# Patient Record
Sex: FEMALE | Race: WHITE
[De-identification: names, ages, dates, MRNs, and addresses within clinical notes are randomized per-mention and may not be internally consistent; named-entity substitution may affect disease eponyms.]

---

## 2018-12-25 ENCOUNTER — HOSPITAL ENCOUNTER (INPATIENT)
Dept: HOSPITAL 47 - EC | Age: 83
LOS: 9 days | Discharge: HOME HEALTH | DRG: 872 | End: 2019-01-03
Payer: MEDICARE

## 2018-12-25 VITALS — BODY MASS INDEX: 27.9 KG/M2

## 2018-12-25 DIAGNOSIS — H40.9: ICD-10-CM

## 2018-12-25 DIAGNOSIS — Z88.2: ICD-10-CM

## 2018-12-25 DIAGNOSIS — R53.81: ICD-10-CM

## 2018-12-25 DIAGNOSIS — R17: ICD-10-CM

## 2018-12-25 DIAGNOSIS — Z95.0: ICD-10-CM

## 2018-12-25 DIAGNOSIS — Z80.1: ICD-10-CM

## 2018-12-25 DIAGNOSIS — I10: ICD-10-CM

## 2018-12-25 DIAGNOSIS — R41.0: ICD-10-CM

## 2018-12-25 DIAGNOSIS — R62.7: ICD-10-CM

## 2018-12-25 DIAGNOSIS — H35.30: ICD-10-CM

## 2018-12-25 DIAGNOSIS — Z82.5: ICD-10-CM

## 2018-12-25 DIAGNOSIS — J98.01: ICD-10-CM

## 2018-12-25 DIAGNOSIS — A41.9: Primary | ICD-10-CM

## 2018-12-25 DIAGNOSIS — E86.0: ICD-10-CM

## 2018-12-25 DIAGNOSIS — N39.0: ICD-10-CM

## 2018-12-25 DIAGNOSIS — Z88.5: ICD-10-CM

## 2018-12-25 DIAGNOSIS — I44.0: ICD-10-CM

## 2018-12-25 DIAGNOSIS — D61.818: ICD-10-CM

## 2018-12-25 DIAGNOSIS — Z87.891: ICD-10-CM

## 2018-12-25 DIAGNOSIS — E78.5: ICD-10-CM

## 2018-12-25 DIAGNOSIS — I48.2: ICD-10-CM

## 2018-12-25 DIAGNOSIS — Z79.01: ICD-10-CM

## 2018-12-25 DIAGNOSIS — C94.6: ICD-10-CM

## 2018-12-25 DIAGNOSIS — Z90.49: ICD-10-CM

## 2018-12-25 DIAGNOSIS — Z88.8: ICD-10-CM

## 2018-12-25 DIAGNOSIS — E89.0: ICD-10-CM

## 2018-12-25 LAB
ALBUMIN SERPL-MCNC: 3.4 G/DL (ref 3.5–5)
ALP SERPL-CCNC: 135 U/L (ref 38–126)
ALT SERPL-CCNC: 36 U/L (ref 9–52)
AMYLASE SERPL-CCNC: 68 U/L (ref 30–110)
ANION GAP SERPL CALC-SCNC: 8 MMOL/L
AST SERPL-CCNC: 61 U/L (ref 14–36)
BASOPHILS # BLD AUTO: 0 K/UL (ref 0–0.2)
BASOPHILS NFR BLD AUTO: 0 %
BUN SERPL-SCNC: 19 MG/DL (ref 7–17)
CALCIUM SPEC-MCNC: 8.6 MG/DL (ref 8.4–10.2)
CHLORIDE SERPL-SCNC: 108 MMOL/L (ref 98–107)
CK SERPL-CCNC: 88 U/L (ref 30–135)
CO2 SERPL-SCNC: 24 MMOL/L (ref 22–30)
EOSINOPHIL # BLD AUTO: 0.1 K/UL (ref 0–0.7)
EOSINOPHIL NFR BLD AUTO: 3 %
ERYTHROCYTE [DISTWIDTH] IN BLOOD BY AUTOMATED COUNT: 3.6 M/UL (ref 3.8–5.4)
ERYTHROCYTE [DISTWIDTH] IN BLOOD: 15 % (ref 11.5–15.5)
GLUCOSE SERPL-MCNC: 126 MG/DL (ref 74–99)
HCT VFR BLD AUTO: 38.9 % (ref 34–46)
HGB BLD-MCNC: 12.4 GM/DL (ref 11.4–16)
LIPASE SERPL-CCNC: 307 U/L (ref 23–300)
LYMPHOCYTES # SPEC AUTO: 0.5 K/UL (ref 1–4.8)
LYMPHOCYTES NFR SPEC AUTO: 9 %
MCH RBC QN AUTO: 34.5 PG (ref 25–35)
MCHC RBC AUTO-ENTMCNC: 31.9 G/DL (ref 31–37)
MCV RBC AUTO: 108.2 FL (ref 80–100)
MONOCYTES # BLD AUTO: 0.3 K/UL (ref 0–1)
MONOCYTES NFR BLD AUTO: 5 %
NEUTROPHILS # BLD AUTO: 4.8 K/UL (ref 1.3–7.7)
NEUTROPHILS NFR BLD AUTO: 81 %
PLATELET # BLD AUTO: 56 K/UL (ref 150–450)
POTASSIUM SERPL-SCNC: 3.9 MMOL/L (ref 3.5–5.1)
PROT SERPL-MCNC: 7.9 G/DL (ref 6.3–8.2)
SODIUM SERPL-SCNC: 140 MMOL/L (ref 137–145)
TROPONIN I SERPL-MCNC: <0.012 NG/ML (ref 0–0.03)
WBC # BLD AUTO: 5.9 K/UL (ref 3.8–10.6)

## 2018-12-25 PROCEDURE — 82784 ASSAY IGA/IGD/IGG/IGM EACH: CPT

## 2018-12-25 PROCEDURE — 82553 CREATINE MB FRACTION: CPT

## 2018-12-25 PROCEDURE — 83921 ORGANIC ACID SINGLE QUANT: CPT

## 2018-12-25 PROCEDURE — 83036 HEMOGLOBIN GLYCOSYLATED A1C: CPT

## 2018-12-25 PROCEDURE — 83540 ASSAY OF IRON: CPT

## 2018-12-25 PROCEDURE — 85610 PROTHROMBIN TIME: CPT

## 2018-12-25 PROCEDURE — 86334 IMMUNOFIX E-PHORESIS SERUM: CPT

## 2018-12-25 PROCEDURE — 71046 X-RAY EXAM CHEST 2 VIEWS: CPT

## 2018-12-25 PROCEDURE — 74018 RADEX ABDOMEN 1 VIEW: CPT

## 2018-12-25 PROCEDURE — 36415 COLL VENOUS BLD VENIPUNCTURE: CPT

## 2018-12-25 PROCEDURE — 87077 CULTURE AEROBIC IDENTIFY: CPT

## 2018-12-25 PROCEDURE — 80074 ACUTE HEPATITIS PANEL: CPT

## 2018-12-25 PROCEDURE — 87086 URINE CULTURE/COLONY COUNT: CPT

## 2018-12-25 PROCEDURE — 84165 PROTEIN E-PHORESIS SERUM: CPT

## 2018-12-25 PROCEDURE — 85025 COMPLETE CBC W/AUTO DIFF WBC: CPT

## 2018-12-25 PROCEDURE — 83880 ASSAY OF NATRIURETIC PEPTIDE: CPT

## 2018-12-25 PROCEDURE — 99285 EMERGENCY DEPT VISIT HI MDM: CPT

## 2018-12-25 PROCEDURE — 83883 ASSAY NEPHELOMETRY NOT SPEC: CPT

## 2018-12-25 PROCEDURE — 83690 ASSAY OF LIPASE: CPT

## 2018-12-25 PROCEDURE — 94640 AIRWAY INHALATION TREATMENT: CPT

## 2018-12-25 PROCEDURE — 82607 VITAMIN B-12: CPT

## 2018-12-25 PROCEDURE — 87186 SC STD MICRODIL/AGAR DIL: CPT

## 2018-12-25 PROCEDURE — 84484 ASSAY OF TROPONIN QUANT: CPT

## 2018-12-25 PROCEDURE — 85730 THROMBOPLASTIN TIME PARTIAL: CPT

## 2018-12-25 PROCEDURE — 82746 ASSAY OF FOLIC ACID SERUM: CPT

## 2018-12-25 PROCEDURE — 81001 URINALYSIS AUTO W/SCOPE: CPT

## 2018-12-25 PROCEDURE — 83605 ASSAY OF LACTIC ACID: CPT

## 2018-12-25 PROCEDURE — 85027 COMPLETE CBC AUTOMATED: CPT

## 2018-12-25 PROCEDURE — 80053 COMPREHEN METABOLIC PANEL: CPT

## 2018-12-25 PROCEDURE — 83550 IRON BINDING TEST: CPT

## 2018-12-25 PROCEDURE — 74177 CT ABD & PELVIS W/CONTRAST: CPT

## 2018-12-25 PROCEDURE — 82550 ASSAY OF CK (CPK): CPT

## 2018-12-25 PROCEDURE — 96361 HYDRATE IV INFUSION ADD-ON: CPT

## 2018-12-25 PROCEDURE — 85045 AUTOMATED RETICULOCYTE COUNT: CPT

## 2018-12-25 PROCEDURE — 82728 ASSAY OF FERRITIN: CPT

## 2018-12-25 PROCEDURE — 83735 ASSAY OF MAGNESIUM: CPT

## 2018-12-25 PROCEDURE — 83615 LACTATE (LD) (LDH) ENZYME: CPT

## 2018-12-25 PROCEDURE — 96374 THER/PROPH/DIAG INJ IV PUSH: CPT

## 2018-12-25 PROCEDURE — 82150 ASSAY OF AMYLASE: CPT

## 2018-12-25 NOTE — ED
Weakness HPI





- General


Chief complaint: Weakness


Stated complaint: Weakness


Time Seen by Provider: 18 22:38


Source: patient, EMS





- History of Present Illness


Initial comments: 


Magdalena is a pleasant 88-year-old female who is brought to the emergency 

department today via EMS for evaluation of generalized malaise, nausea and 

vomiting.  Patient reports she hasn't been feeling well for couple of days 

however she didn't seek any care because she didn't want to rule in the 

holidays for her family.  Patient reports she's been fatigued, however loss of 

appetite and been much more tired than usual.  Patient states that today she 

had no appetite but she did have [place] dinner with her son.  Her son states 

that he only noticed that she took some few nibbles of her food.  A couple 

hours after eating the patient became nauseated and had multiple episodes of 

nonbloody nonbilious emesis.  At that point son decided to bring her to the 

emergency department however the patient was too weak to stand even with her 

assistance he could not get her to the car therefore decision was made to call 

EMS.  Patient denies any chest pain palpitations or shortness of breath.  She 

does report some recent loose stools.  She has been in contact with people who 

have had some similar symptoms including nausea, vomiting and diarrhea.








- Related Data


 Home Medications











 Medication  Instructions  Recorded  Confirmed


 


Rivaroxaban [Xarelto] 15 mg PO DAILY 18


 


Brimonidine Tartrate/Timolol 1 drop BOTH EYES BID 18





[Combigan 0.2%-0.5% Eye Drops]   











 Allergies











Allergy/AdvReac Type Severity Reaction Status Date / Time


 


morphine AdvReac Intermediate Nausea & Verified 18 23:14





   Vomiting  


 


Sulfa (Sulfonamide AdvReac Intermediate Nausea & Verified 18 23:14





Antibiotics)   Vomiting  


 


Statins-Hmg-Coa Reductase AdvReac  Nausea Verified 18 23:14





Inhibitor     














Review of Systems


ROS Statement: 


Those systems with pertinent positive or pertinent negative responses have been 

documented in the HPI.





ROS Other: All systems not noted in ROS Statement are negative.





Past Medical History


Past Medical History: Chest Pain / Angina, Hypertension


Additional Past Medical History / Comment(s): hypokalemia


History of Any Multi-Drug Resistant Organisms: None Reported


Past Surgical History: Cholecystectomy, Pacemaker


Additional Past Surgical History / Comment(s): thyroidectomy


Past Anesthesia/Blood Transfusion Reactions: No Reported Reaction


Additional Past Anesthesia/Blood Transfusion Reaction / Comment(s): NEVER 

RECIEVED BLOOD.


Type of Cardiac Device: Permanent Pacemaker


Device Placement Date:: 


Past Psychological History: No Psychological Hx Reported, Depression


Smoking Status: Former smoker


Past Alcohol Use History: Rare


Past Drug Use History: None Reported





- Past Family History


  ** Father


Family Medical History: Cancer, COPD


Additional Family Medical History / Comment(s): FATHER  OF LUNG [place]





  ** Mother


Family Medical History: Cancer


Additional Family Medical History / Comment(s): MOTHER  OF SOME FORM OF CA 

WHEN PT WAS 3 YRS OLD.





General Exam





- General Exam Comments


Initial Comments: 


Physical Exam


GENERAL:


Elderly female, appears uncomfortable, appears nauseated





HENT:


Normocephalic, Atraumatic. 


Dry oral mucosa





EYES:


PERRL, EOMI





PULMONARY:


Unlabored respirations.  No audible rales rhonchi or wheezing was noted.





CARDIOVASCULAR:


There is a regular rate and rhythm without any murmurs gallops or rubs.  





ABDOMEN:


Soft with normal bowel sounds. 


Tenderness to palpation in the epigastrium





SKIN:


Skin is clear with no lesions or rashes and otherwise unremarkable.





: 


Deferred





NEUROLOGIC:


Patient is alert and oriented x3.  Moving all extremities spontaneously





MUSCULOSKELETAL:


Normal extremities with adequate strength and full range of motion.  No lower 

extremity swelling or edema.  No calf tenderness.  





PSYCHIATRIC:


Normal psychiatric evaluation.  





Limitations: no limitations








Course


 Vital Signs











  18





  22:11


 


Pulse Rate 92


 


Respiratory 16





Rate 


 


Blood Pressure 198/73


 


O2 Sat by Pulse 97





Oximetry 














EKG Findings





- EKG Comments:


EKG Findings:: EKG obtained at 10:22 PM, rate is 89 rhythm is sinus, there is a 

first-degree AV block, , QRS 84, .  No acute ST elevations or 

depressions no evidence of acute ischemia or infarction.





Medical Decision Making





- Medical Decision Making


Patient was seen and evaluated, history was obtained from the patient and 

family at bedside


Labs and imaging were ordered





Labs with multiple mild abnormalities including mildly elevated transaminases, 

elevated bili, elevated lipase, leukocytosis


Computed tomography scan was ordered





Patient was reevaluated, some improvement in nausea after Zofran, IV fluids are 

infusing


Urinalysis suggestive of urinary tract infection, culture was ordered, Rocephin 

ordered





Given the patient's advanced age, persistent nausea, multiple lab abnormalities 

I do feel that she would benefit from being admitted to the hospital for IV 

fluid rehydration and reevaluation of her labs in the morning.  Patient is 

agreeable to this.  Admission orders were placed.








- Lab Data


Result diagrams: 


 18 22:15





 18 22:15


 Lab Results











  18 Range/Units





  22:15 22:15 22:15 


 


WBC    5.9  (3.8-10.6)  k/uL


 


RBC    3.60 L  (3.80-5.40)  m/uL


 


Hgb    12.4  (11.4-16.0)  gm/dL


 


Hct    38.9  (34.0-46.0)  %


 


MCV    108.2 H  (80.0-100.0)  fL


 


MCH    34.5  (25.0-35.0)  pg


 


MCHC    31.9  (31.0-37.0)  g/dL


 


RDW    15.0  (11.5-15.5)  %


 


Plt Count    56 L  (150-450)  k/uL


 


Neutrophils %    81  %


 


Lymphocytes %    9  %


 


Monocytes %    5  %


 


Eosinophils %    3  %


 


Basophils %    0  %


 


Neutrophils #    4.8  (1.3-7.7)  k/uL


 


Lymphocytes #    0.5 L  (1.0-4.8)  k/uL


 


Monocytes #    0.3  (0-1.0)  k/uL


 


Eosinophils #    0.1  (0-0.7)  k/uL


 


Basophils #    0.0  (0-0.2)  k/uL


 


Macrocytosis    Marked  


 


Sodium  140    (137-145)  mmol/L


 


Potassium  3.9    (3.5-5.1)  mmol/L


 


Chloride  108 H    ()  mmol/L


 


Carbon Dioxide  24    (22-30)  mmol/L


 


Anion Gap  8    mmol/L


 


BUN  19 H    (7-17)  mg/dL


 


Creatinine  0.67    (0.52-1.04)  mg/dL


 


Est GFR (CKD-EPI)AfAm  >90    (>60 ml/min/1.73 sqM)  


 


Est GFR (CKD-EPI)NonAf  79    (>60 ml/min/1.73 sqM)  


 


Glucose  126 H    (74-99)  mg/dL


 


Calcium  8.6    (8.4-10.2)  mg/dL


 


Total Bilirubin  2.2 H    (0.2-1.3)  mg/dL


 


AST  61 H    (14-36)  U/L


 


ALT  36    (9-52)  U/L


 


Alkaline Phosphatase  135 H    ()  U/L


 


Total Creatine Kinase   88   ()  U/L


 


CK-MB (CK-2)   1.1   (0.0-2.4)  ng/mL


 


CK-MB (CK-2) Rel Index   1.3   


 


Troponin I   <0.012   (0.000-0.034)  ng/mL


 


Total Protein  7.9    (6.3-8.2)  g/dL


 


Albumin  3.4 L    (3.5-5.0)  g/dL


 


Amylase  68    ()  U/L


 


Lipase  307 H    ()  U/L


 


Urine Color     


 


Urine Appearance     (Clear)  


 


Urine pH     (5.0-8.0)  


 


Ur Specific Gravity     (1.001-1.035)  


 


Urine Protein     (Negative)  


 


Urine Glucose (UA)     (Negative)  


 


Urine Ketones     (Negative)  


 


Urine Blood     (Negative)  


 


Urine Nitrite     (Negative)  


 


Urine Bilirubin     (Negative)  


 


Urine Urobilinogen     (<2.0)  mg/dL


 


Ur Leukocyte Esterase     (Negative)  


 


Urine RBC     (0-5)  /hpf


 


Urine WBC     (0-5)  /hpf


 


Ur Squamous Epith Cells     (0-4)  /hpf


 


Urine Bacteria     (None)  /hpf


 


Urine Mucus     (None)  /hpf














  18 Range/Units





  00:33 


 


WBC   (3.8-10.6)  k/uL


 


RBC   (3.80-5.40)  m/uL


 


Hgb   (11.4-16.0)  gm/dL


 


Hct   (34.0-46.0)  %


 


MCV   (80.0-100.0)  fL


 


MCH   (25.0-35.0)  pg


 


MCHC   (31.0-37.0)  g/dL


 


RDW   (11.5-15.5)  %


 


Plt Count   (150-450)  k/uL


 


Neutrophils %   %


 


Lymphocytes %   %


 


Monocytes %   %


 


Eosinophils %   %


 


Basophils %   %


 


Neutrophils #   (1.3-7.7)  k/uL


 


Lymphocytes #   (1.0-4.8)  k/uL


 


Monocytes #   (0-1.0)  k/uL


 


Eosinophils #   (0-0.7)  k/uL


 


Basophils #   (0-0.2)  k/uL


 


Macrocytosis   


 


Sodium   (137-145)  mmol/L


 


Potassium   (3.5-5.1)  mmol/L


 


Chloride   ()  mmol/L


 


Carbon Dioxide   (22-30)  mmol/L


 


Anion Gap   mmol/L


 


BUN   (7-17)  mg/dL


 


Creatinine   (0.52-1.04)  mg/dL


 


Est GFR (CKD-EPI)AfAm   (>60 ml/min/1.73 sqM)  


 


Est GFR (CKD-EPI)NonAf   (>60 ml/min/1.73 sqM)  


 


Glucose   (74-99)  mg/dL


 


Calcium   (8.4-10.2)  mg/dL


 


Total Bilirubin   (0.2-1.3)  mg/dL


 


AST   (14-36)  U/L


 


ALT   (9-52)  U/L


 


Alkaline Phosphatase   ()  U/L


 


Total Creatine Kinase   ()  U/L


 


CK-MB (CK-2)   (0.0-2.4)  ng/mL


 


CK-MB (CK-2) Rel Index   


 


Troponin I   (0.000-0.034)  ng/mL


 


Total Protein   (6.3-8.2)  g/dL


 


Albumin   (3.5-5.0)  g/dL


 


Amylase   ()  U/L


 


Lipase   ()  U/L


 


Urine Color  Yellow  


 


Urine Appearance  Cloudy H  (Clear)  


 


Urine pH  5.5  (5.0-8.0)  


 


Ur Specific Gravity  1.022  (1.001-1.035)  


 


Urine Protein  Trace H  (Negative)  


 


Urine Glucose (UA)  Negative  (Negative)  


 


Urine Ketones  Trace H  (Negative)  


 


Urine Blood  Small H  (Negative)  


 


Urine Nitrite  Negative  (Negative)  


 


Urine Bilirubin  Negative  (Negative)  


 


Urine Urobilinogen  2.0  (<2.0)  mg/dL


 


Ur Leukocyte Esterase  Large H  (Negative)  


 


Urine RBC  10 H  (0-5)  /hpf


 


Urine WBC  58 H  (0-5)  /hpf


 


Ur Squamous Epith Cells  24 H  (0-4)  /hpf


 


Urine Bacteria  Occasional H  (None)  /hpf


 


Urine Mucus  Few H  (None)  /hpf














Disposition


Clinical Impression: 


 Dehydration, Transaminitis, Elevated bilirubin, Nausea and vomiting, UTI (

urinary tract infection)





Disposition: ADMITTED AS IP TO THIS HOSP


Condition: Stable


Is patient prescribed a controlled substance at d/c from ED?: No


Referrals: 


Oscar Chavez MD [Primary Care Provider] - 1-2 days

## 2018-12-26 LAB
PH UR: 5.5 [PH] (ref 5–8)
RBC UR QL: 10 /HPF (ref 0–5)
SP GR UR: 1.02 (ref 1–1.03)
SQUAMOUS UR QL AUTO: 24 /HPF (ref 0–4)
UROBILINOGEN UR QL STRIP: 2 MG/DL (ref ?–2)
WBC #/AREA URNS HPF: 58 /HPF (ref 0–5)

## 2018-12-26 RX ADMIN — TIMOLOL MALEATE SCH DROPS: 5 SOLUTION OPHTHALMIC at 21:03

## 2018-12-26 RX ADMIN — FAMOTIDINE SCH MG: 20 TABLET, FILM COATED ORAL at 08:47

## 2018-12-26 RX ADMIN — FAMOTIDINE SCH MG: 20 TABLET, FILM COATED ORAL at 21:02

## 2018-12-26 RX ADMIN — ISODIUM CHLORIDE SCH: 0.03 SOLUTION RESPIRATORY (INHALATION) at 23:53

## 2018-12-26 RX ADMIN — ONDANSETRON PRN MG: 2 INJECTION INTRAMUSCULAR; INTRAVENOUS at 15:48

## 2018-12-26 RX ADMIN — BRIMONIDINE TARTRATE SCH DROPS: 2 SOLUTION/ DROPS OPHTHALMIC at 21:02

## 2018-12-26 RX ADMIN — CEFAZOLIN SCH MLS/HR: 330 INJECTION, POWDER, FOR SOLUTION INTRAMUSCULAR; INTRAVENOUS at 03:06

## 2018-12-26 RX ADMIN — CEFAZOLIN SCH: 330 INJECTION, POWDER, FOR SOLUTION INTRAMUSCULAR; INTRAVENOUS at 17:02

## 2018-12-26 RX ADMIN — POTASSIUM CHLORIDE SCH MLS/HR: 14.9 INJECTION, SOLUTION INTRAVENOUS at 18:29

## 2018-12-26 NOTE — CT
EXAMINATION TYPE: CT abdomen pelvis w con

 

DATE OF EXAM: 12/26/2018

 

COMPARISON: 1/30/2015

 

HISTORY: vomiting

 

CT DLP: 792.5 mGycm

Automated exposure control for dose reduction was used.

 

TECHNIQUE:  Helical acquisition of images was performed from the lung bases through the pelvis.

 

CONTRAST: 

Performed without Oral Contrast and with IV Contrast, patient injected with 100 mL of Isovue 300.

 

FINDINGS: 

 

There is left pleural effusion. Heart is enlarged. There is no pericardial effusion. There is small h
iatal hernia. Lung bases are clear of consolidation.

 

Liver shows no focal defect. Gallbladder appears absent. There are multiple tiny calcified splenic gr
anulomata. There is no evidence of pancreatic mass.

 

There is no adrenal mass. Kidneys show satisfactory contrast opacification. There is no hydronephrosi
s. There is no retroperitoneal adenopathy. Abdominal aorta is atheromatous. Appendix is not seen. The
re is no evidence of a bowel obstruction. Bladder distends smoothly. There is pessary in the vagina. 
There is no inguinal hernia. There is no free fluid in the pelvis. I see no mesenteric edema. There a
re multiple small mesenteric lymph nodes. There is degenerative disc space narrowing the lumbar spine
. I see no compression fracture. Bony pelvis is intact. There is mild anterior wedging of T10 vertebr
a with 20% loss of height. This is probably old. Fracture is new compared to old CT scan.

IMPRESSION: 

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 SMALL LEFT PLEURAL EFFUSION UNCHANGED. SMALL HIATAL HERNIA. CARDIOMEGALY. 
NO EVIDENCE OF A BOWEL OBSTRUCTION.

## 2018-12-26 NOTE — HP
HISTORY AND PHYSICAL



CHIEF COMPLAINT:

Generalized weakness, dehydration, nausea, vomiting, and probable UTI.



HISTORY OF PRESENT ILLNESS:

This is another admission for this 88-year-old white female.  She is ordinarily in

fairly good health.  She has had some problems with mild hypertension and atrial

fibrillation.  For about 2 days she has not felt well, has not been eating well.  On

the night of admission she suddenly developed chills, weakness, nausea and vomiting and

no diarrhea.  She came to the emergency room, where she was dehydrated, and urine

suggested that she had UTI.  She was admitted.



REVIEW OF SYSTEMS:

She denies any headaches, confusion, cough, hemoptysis, shortness of breath, chest

pain, abdominal pain, etc.



Past medical history, family history, and personal and social histories are all

otherwise unremarkable and noncontributory.  She does not smoke.



PHYSICAL EXAMINATION:

Blood pressure is 136/83 with a pulse of 92 and regular. Respirations were 36 and she

was afebrile.  In general, she appeared to be pale and dehydrated.  Skin was dry.

Mucous membranes were dry.  Head, ears, eyes, nose, mouth and throat were otherwise

normal.  There was no neck vein distention.  Chest was clear.  There were no rales or

rhonchi.  Cardiac exam demonstrated regular rate and rhythm.  The abdomen was soft.

Bowel sounds were heard.  There were no masses or visceromegaly and she was not tender.

Extremities were normal. Neurologically she was intact.



She is admitted to the hospital with the diagnoses:

1. Probable urinary tract infection.

2. Dehydration.

3. History of atrial fibrillation.

4. History of hypertension.



PLAN:

1. Bed rest.

2. IV fluids.

3. IV antibiotics.

4. Rehydrate.





MMODL / IJN: [id number] / Job#: [id number]

## 2018-12-26 NOTE — XR
EXAMINATION TYPE: XR KUB

 

DATE OF EXAM: 12/25/2018

 

COMPARISON: NONE

 

HISTORY: Vomiting

 

TECHNIQUE: 2 views upright

 

FINDINGS: There is no sign of intestinal obstruction or pneumoperitoneum. Fecal pattern is normal. Elke
ng bases appear clear. There are no pathologic calcifications over the kidneys.

 

IMPRESSION: Nonacute abdomen.

## 2018-12-26 NOTE — XR
EXAMINATION TYPE: XR chest 2V

 

DATE OF EXAM: 12/26/2018

 

COMPARISON: 1/27/2018

 

HISTORY: Wheezing

 

TECHNIQUE:  Frontal and lateral views of the chest are obtained.

 

FINDINGS:  Heart is enlarged. There is pulmonary interstitial edema. There is slight blunting of cost
ophrenic angles. There is left axillary pacemaker with the lead tips in the right ventricle.

 

IMPRESSION:  Mild heart failure. Small pleural effusions. Heart failure increased compared to last ex
am.

## 2018-12-27 LAB
ALBUMIN SERPL-MCNC: 2.7 G/DL (ref 3.5–5)
ALP SERPL-CCNC: 87 U/L (ref 38–126)
ALT SERPL-CCNC: 38 U/L (ref 9–52)
AMYLASE SERPL-CCNC: 54 U/L (ref 30–110)
AMYLASE SERPL-CCNC: 57 U/L (ref 30–110)
ANION GAP SERPL CALC-SCNC: 6 MMOL/L
APTT BLD: 39.3 SEC (ref 22–30)
AST SERPL-CCNC: 89 U/L (ref 14–36)
BASOPHILS # BLD MANUAL: 0.05 K/UL (ref 0–0.2)
BUN SERPL-SCNC: 18 MG/DL (ref 7–17)
CALCIUM SPEC-MCNC: 7.3 MG/DL (ref 8.4–10.2)
CELLS COUNTED: 100
CELLS COUNTED: 100
CHLORIDE SERPL-SCNC: 108 MMOL/L (ref 98–107)
CO2 SERPL-SCNC: 23 MMOL/L (ref 22–30)
EOSINOPHIL # BLD MANUAL: 0.12 K/UL (ref 0–0.7)
EOSINOPHIL # BLD MANUAL: 0.16 K/UL (ref 0–0.7)
ERYTHROCYTE [DISTWIDTH] IN BLOOD BY AUTOMATED COUNT: 3.02 M/UL (ref 3.8–5.4)
ERYTHROCYTE [DISTWIDTH] IN BLOOD BY AUTOMATED COUNT: 3.06 M/UL (ref 3.8–5.4)
ERYTHROCYTE [DISTWIDTH] IN BLOOD: 14.9 % (ref 11.5–15.5)
ERYTHROCYTE [DISTWIDTH] IN BLOOD: 15 % (ref 11.5–15.5)
GLUCOSE BLD-MCNC: 139 MG/DL (ref 75–99)
GLUCOSE SERPL-MCNC: 169 MG/DL (ref 74–99)
HCT VFR BLD AUTO: 33.4 % (ref 34–46)
HCT VFR BLD AUTO: 33.5 % (ref 34–46)
HGB BLD-MCNC: 10.7 GM/DL (ref 11.4–16)
HGB BLD-MCNC: 10.7 GM/DL (ref 11.4–16)
INR PPP: 2.2 (ref ?–1.2)
LDH SPEC-CCNC: 826 U/L (ref 313–618)
LIPASE SERPL-CCNC: 282 U/L (ref 23–300)
LIPASE SERPL-CCNC: 299 U/L (ref 23–300)
LYMPHOCYTES # BLD MANUAL: 0.32 K/UL (ref 1–4.8)
LYMPHOCYTES # BLD MANUAL: 0.64 K/UL (ref 1–4.8)
MAGNESIUM SPEC-SCNC: 1.5 MG/DL (ref 1.6–2.3)
MCH RBC QN AUTO: 34.8 PG (ref 25–35)
MCH RBC QN AUTO: 35.5 PG (ref 25–35)
MCHC RBC AUTO-ENTMCNC: 31.8 G/DL (ref 31–37)
MCHC RBC AUTO-ENTMCNC: 32.2 G/DL (ref 31–37)
MCV RBC AUTO: 109.3 FL (ref 80–100)
MCV RBC AUTO: 110.3 FL (ref 80–100)
MONOCYTES # BLD MANUAL: 0.28 K/UL (ref 0–1)
MONOCYTES # BLD MANUAL: 0.46 K/UL (ref 0–1)
NEUTROPHILS NFR BLD MANUAL: 47 %
NEUTROPHILS NFR BLD MANUAL: 65 %
NEUTS SEG # BLD MANUAL: 1.08 K/UL (ref 1.3–7.7)
NEUTS SEG # BLD MANUAL: 1.5 K/UL (ref 1.3–7.7)
PLATELET # BLD AUTO: 35 K/UL (ref 150–450)
PLATELET # BLD AUTO: 41 K/UL (ref 150–450)
POTASSIUM SERPL-SCNC: 3.9 MMOL/L (ref 3.5–5.1)
PROT SERPL-MCNC: 6.8 G/DL (ref 6.3–8.2)
PT BLD: 21.6 SEC (ref 9–12)
SODIUM SERPL-SCNC: 137 MMOL/L (ref 137–145)
WBC # BLD AUTO: 2.3 K/UL (ref 3.8–10.6)
WBC # BLD AUTO: 2.3 K/UL (ref 3.8–10.6)

## 2018-12-27 RX ADMIN — ISODIUM CHLORIDE SCH MG: 0.03 SOLUTION RESPIRATORY (INHALATION) at 19:00

## 2018-12-27 RX ADMIN — ISODIUM CHLORIDE SCH MG: 0.03 SOLUTION RESPIRATORY (INHALATION) at 07:21

## 2018-12-27 RX ADMIN — POTASSIUM CHLORIDE SCH: 14.9 INJECTION, SOLUTION INTRAVENOUS at 20:14

## 2018-12-27 RX ADMIN — TIMOLOL MALEATE SCH DROPS: 5 SOLUTION OPHTHALMIC at 20:45

## 2018-12-27 RX ADMIN — ISODIUM CHLORIDE SCH MG: 0.03 SOLUTION RESPIRATORY (INHALATION) at 11:58

## 2018-12-27 RX ADMIN — POTASSIUM CHLORIDE SCH: 14.9 INJECTION, SOLUTION INTRAVENOUS at 23:50

## 2018-12-27 RX ADMIN — ISODIUM CHLORIDE SCH: 0.03 SOLUTION RESPIRATORY (INHALATION) at 03:08

## 2018-12-27 RX ADMIN — TIMOLOL MALEATE SCH DROPS: 5 SOLUTION OPHTHALMIC at 08:29

## 2018-12-27 RX ADMIN — BRIMONIDINE TARTRATE SCH DROPS: 2 SOLUTION/ DROPS OPHTHALMIC at 20:45

## 2018-12-27 RX ADMIN — BRIMONIDINE TARTRATE SCH DROPS: 2 SOLUTION/ DROPS OPHTHALMIC at 08:29

## 2018-12-27 RX ADMIN — POTASSIUM CHLORIDE SCH MLS/HR: 14.9 INJECTION, SOLUTION INTRAVENOUS at 20:49

## 2018-12-27 RX ADMIN — FAMOTIDINE SCH MG: 20 TABLET, FILM COATED ORAL at 08:29

## 2018-12-27 RX ADMIN — AMPICILLIN SODIUM AND SULBACTAM SODIUM SCH MLS/HR: 1; .5 INJECTION, POWDER, FOR SOLUTION INTRAMUSCULAR; INTRAVENOUS at 23:49

## 2018-12-27 RX ADMIN — ISODIUM CHLORIDE SCH MG: 0.03 SOLUTION RESPIRATORY (INHALATION) at 15:29

## 2018-12-27 RX ADMIN — AMPICILLIN SODIUM AND SULBACTAM SODIUM SCH MLS/HR: 1; .5 INJECTION, POWDER, FOR SOLUTION INTRAMUSCULAR; INTRAVENOUS at 16:53

## 2018-12-27 NOTE — PN
PROGRESS NOTE



CHIEF COMPLAINT:

Urinary tract infection.



HISTORY OF PRESENT ILLNESS:

This lady is doing a little bit better.  Hydration is improved.  She did have a little

wheezing during the night, but no chest pain, fever, chills, cough, etc.  Her CBC is

surprising with a low WBC count, hemoglobin and platelet count.



PHYSICAL EXAMINATION:

Hydration is improved.  Her chest is clear.  There are no significant rales or rhonchi.

Cardiac exam is normal.  Abdomen is soft, nontender.



IMPRESSION:

1. Urinary tract infection.

2. Pancytopenia.

3. Dehydration.



PLAN:

1. Hematology consult.

2. Continue with IV fluids and antibiotics.





MMODL / IJN: [id number] / Job#: [id number]

## 2018-12-27 NOTE — P.CONS
History of Present Illness





- Reason for Consult


Consult date: 18


Pancytopenia


Requesting physician: Oscar Chavez





- Chief Complaint


Weakness, UTI





- History of Present Illness





Ms. Leonard is a pleasant 88 year old female who was initially seen by Dr Kothari 

in 2016 for intermitent lekopenia and thrombocytopenia. At this time her 

platelets ranged in the 35-40K range. Her prior history shows records of 

clumping of her platelets when drawn in a citrated CBC, therefore causing 

artefactually low platlet counts, which was felt to be underestimated. SHe did 

undergo full thrombocytopenia work-up at this intial consult without any 

identifiable evidence of differential etiology. 





Ms. Leonard requires her platlet count to be ran in a citrate tube, but ran 

immediately, for a more accurate level to be obtained. She is on xarelto for 

known atrial fibrillation. 


She presented to hospital with increased weakness and currently being treated 

for UTI with antibiotics. Her resulted platlet count today is 35k. With her 

known history of underestimated thrombocytopenia, it is recommended her CBC in 

AM be drawn in a citrate tube and will be instructed to be ran immediately for 

a more accurate results, although there is some unavoidable delays in 

processing with this an accurate level maybe difficult to obtain. Since her 

baseline is a mild thrombocytopenia at the last follow-up with Ms. Leonard it 

was felt she may have mild early signs of MDS or ITP. She was last seen by Dr. Kothari in early 2017. With her presentation of acute infection and a platlet count 

less than 50K without knowing if accurate result it is recommended to hold 

xarelto morning dose and attempt to obtain an immediate run sample, as acute 

infection may cause a further decrease in platlet counts. no evidence of acute 

bleeding noted, although she does present with lymphocytopenia and anemia this 

admission.  





Review of Systems





A 14 point review of systems was assessed and completed and all negative except 

HPI





Past Medical History


Past Medical History: Atrial Fibrillation, Chest Pain / Angina, Hyperlipidemia, 

Hypertension, Syncope


Additional Past Medical History / Comment(s): hypokalemia, 1st degree heart 

block, macular degeneration, glaucoma


History of Any Multi-Drug Resistant Organisms: None Reported


Past Surgical History: Cholecystectomy, Pacemaker


Additional Past Surgical History / Comment(s): thyroidectomy


Past Anesthesia/Blood Transfusion Reactions: No Reported Reaction


Additional Past Anesthesia/Blood Transfusion Reaction / Comm: NEVER RECIEVED 

BLOOD.


Type of Cardiac Device: Permanent Pacemaker


Device Placement Date:: 


Past Psychological History: No Psychological Hx Reported, Depression


Additional Psychological History / Comment(s): PT LIVES CURRENTLY WITH [place] QUINN) AND [place]'S .  NORMALLY BEFORE THIS WEAKNESS SHE HAS BEEN 

INDEPENDENT WITH HER OWN ADL'S. SHE HAS VOLUNTARILY GIVEN UP DRIVING.


Smoking Status: Former smoker


Past Alcohol Use History: Rare


Past Drug Use History: None Reported





- Past Family History


  ** Father


Family Medical History: Cancer, COPD


Additional Family Medical History / Comment(s): FATHER  OF LUNG [place]





  ** Mother


Family Medical History: Cancer


Additional Family Medical History / Comment(s): MOTHER  OF SOME FORM OF CA 

WHEN PT WAS 3 YRS OLD.





Medications and Allergies


 Home Medications











 Medication  Instructions  Recorded  Confirmed  Type


 


Rivaroxaban [Xarelto] 15 mg PO DAILY 18 History


 


Brimonidine Tartrate/Timolol 1 drop BOTH EYES BID 18 History





[Combigan 0.2%-0.5% Eye Drops]    











 Allergies











Allergy/AdvReac Type Severity Reaction Status Date / Time


 


morphine AdvReac Intermediate Nausea & Verified 18 23:14





   Vomiting  


 


Sulfa (Sulfonamide AdvReac Intermediate Nausea & Verified 18 23:14





Antibiotics)   Vomiting  


 


Statins-Hmg-Coa Reductase AdvReac  Nausea Verified 18 23:14





Inhibitor     














Physical Exam


Vitals: 


 Vital Signs











  Temp Pulse Pulse Resp BP Pulse Ox


 


 18 12:06   82    


 


 18 11:58   84    


 


 18 07:29   84    


 


 18 07:21   84    


 


 18 07:15  98.6 F   71  18  167/68  95


 


 18 04:00     18  


 


 18 00:00     18  


 


 18 23:09  99.2 F   69  18  125/68  94 L


 


 18 21:28  99.2 F    18   96


 


 18 20:00     18  


 


 18 19:33  100.2 F H   76  18  101/61  93 L








 Intake and Output











 18





 22:59 06:59 14:59


 


Intake Total 836  354


 


Balance 836  354


 


Intake:   


 


  Oral 836  354


 


Other:   


 


  Voiding Method Toilet Toilet 


 


  # Voids 2 3 














- Constitutional


General appearance: cooperative, no acute distress





- EENT


Eyes: EOMI, dentition normal


ENT: hard of hearing, NA/AT, normal oropharynx





- Neck





no lymphadenopathy


Neck: normal ROM





- Respiratory


Respiratory: bilateral: diminished (bases)





- Cardiovascular


Rhythm: irregularly irregular





- Gastrointestinal


General gastrointestinal: normal bowel sounds, soft





- Integumentary


Integumentary: pale





- Neurologic


Neurologic: CNII-XII intact





- Musculoskeletal


Musculoskeletal: generalized weakness, strength equal bilaterally





- Psychiatric


Psychiatric: appropriate affect





Results


CBC & Chem 7: 


 18 14:49





 18 08:58


Labs: 


 Abnormal Lab Results - Last 24 Hours (Table)











  18 Range/Units





  08:58 08:58 


 


WBC  2.3 L   (3.8-10.6)  k/uL


 


RBC  3.06 L   (3.80-5.40)  m/uL


 


Hgb  10.7 L   (11.4-16.0)  gm/dL


 


Hct  33.5 L   (34.0-46.0)  %


 


MCV  109.3 H   (80.0-100.0)  fL


 


Plt Count  41 L   (150-450)  k/uL


 


Lymphocytes # (Manual)  0.32 L   (1.0-4.8)  k/uL


 


Chloride   108 H  ()  mmol/L


 


BUN   18 H  (7-17)  mg/dL


 


Glucose   169 H  (74-99)  mg/dL


 


Calcium   7.3 L  (8.4-10.2)  mg/dL


 


Magnesium   1.5 L  (1.6-2.3)  mg/dL


 


Total Bilirubin   2.2 H  (0.2-1.3)  mg/dL


 


AST   89 H  (14-36)  U/L


 


Albumin   2.7 L  (3.5-5.0)  g/dL








 Microbiology - Last 24 Hours (Table)











 18 00:33 Urine Culture - Preliminary





 Urine,Clean Catch    Presumptive Staph aureus











Chest x-ray: report reviewed


CT scan - abdomen: report reviewed


CT scan - pelvis: report reviewed





Assessment and Plan


Plan: 





Assessment and Recommendations:





1. Pancytopenia:


Leukopenia: with lymphocytopenia (intermittent since 2016)


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


 - Await full Work-up


Thrombocytopenia:


 - Known History of clumping platlets and underestimated platlet count when not 

drawn immediately in citrate tube and ran. Will attempt to re-drawn in am 12-28

, and run immediately. Although with concurrent acute infection cannot rule out 

accurate thrombocytopenia as her history has a mild thrombocytopenia at 

baseline and an infection may contribute to worsening of this. 


 - Redraw in citrate tube and run immediately


 - Re-work-up other causes (greater than 2 years, last )


 - Patient is on Xarelto, would currently hold with platlet count less than 

50K. To ensure an accurate level for safety. 


 - INR increased (possible component of decreased appetite and vitamin K 

deficiency), monitor and recheck in am





We will continue to follow.

## 2018-12-28 LAB
ALBUMIN SERPL-MCNC: 2.5 G/DL (ref 3.5–5)
ALP SERPL-CCNC: 103 U/L (ref 38–126)
ALT SERPL-CCNC: 39 U/L (ref 9–52)
ANION GAP SERPL CALC-SCNC: 6 MMOL/L
AST SERPL-CCNC: 79 U/L (ref 14–36)
BASOPHILS # BLD AUTO: 0 K/UL (ref 0–0.2)
BASOPHILS NFR BLD AUTO: 0 %
BUN SERPL-SCNC: 12 MG/DL (ref 7–17)
CALCIUM SPEC-MCNC: 7.3 MG/DL (ref 8.4–10.2)
CHLORIDE SERPL-SCNC: 109 MMOL/L (ref 98–107)
CO2 SERPL-SCNC: 23 MMOL/L (ref 22–30)
EOSINOPHIL # BLD AUTO: 0.2 K/UL (ref 0–0.7)
EOSINOPHIL NFR BLD AUTO: 8 %
ERYTHROCYTE [DISTWIDTH] IN BLOOD BY AUTOMATED COUNT: 3.08 M/UL (ref 3.8–5.4)
ERYTHROCYTE [DISTWIDTH] IN BLOOD: 14.7 % (ref 11.5–15.5)
GLUCOSE BLD-MCNC: 130 MG/DL (ref 75–99)
GLUCOSE BLD-MCNC: 140 MG/DL (ref 75–99)
GLUCOSE SERPL-MCNC: 140 MG/DL (ref 74–99)
HBA1C MFR BLD: 6.1 % (ref 4–6)
HCT VFR BLD AUTO: 33.7 % (ref 34–46)
HGB BLD-MCNC: 10.7 GM/DL (ref 11.4–16)
INR PPP: 1.7 (ref ?–1.2)
LYMPHOCYTES # SPEC AUTO: 0.6 K/UL (ref 1–4.8)
LYMPHOCYTES NFR SPEC AUTO: 22 %
MCH RBC QN AUTO: 34.8 PG (ref 25–35)
MCHC RBC AUTO-ENTMCNC: 32.1 G/DL (ref 31–37)
MCV RBC AUTO: 108.7 FL (ref 80–100)
MONOCYTES # BLD AUTO: 0.4 K/UL (ref 0–1)
MONOCYTES NFR BLD AUTO: 13 %
NEUTROPHILS # BLD AUTO: 1.5 K/UL (ref 1.3–7.7)
NEUTROPHILS NFR BLD AUTO: 53 %
PLATELET # BLD AUTO: 43 K/UL (ref 150–450)
POTASSIUM SERPL-SCNC: 3.9 MMOL/L (ref 3.5–5.1)
PROT SERPL-MCNC: 6.5 G/DL (ref 6.3–8.2)
PT BLD: 16.9 SEC (ref 9–12)
SODIUM SERPL-SCNC: 138 MMOL/L (ref 137–145)
VIT B12 SERPL-MCNC: 765 PG/ML (ref 200–944)
WBC # BLD AUTO: 3 K/UL (ref 3.8–10.6)

## 2018-12-28 RX ADMIN — AMPICILLIN SODIUM AND SULBACTAM SODIUM SCH MLS/HR: 1; .5 INJECTION, POWDER, FOR SOLUTION INTRAMUSCULAR; INTRAVENOUS at 08:39

## 2018-12-28 RX ADMIN — FAMOTIDINE SCH MG: 20 TABLET, FILM COATED ORAL at 08:39

## 2018-12-28 RX ADMIN — ISODIUM CHLORIDE SCH MG: 0.03 SOLUTION RESPIRATORY (INHALATION) at 07:31

## 2018-12-28 RX ADMIN — ISODIUM CHLORIDE SCH: 0.03 SOLUTION RESPIRATORY (INHALATION) at 04:55

## 2018-12-28 RX ADMIN — BRIMONIDINE TARTRATE SCH DROPS: 2 SOLUTION/ DROPS OPHTHALMIC at 08:40

## 2018-12-28 RX ADMIN — AMPICILLIN SODIUM AND SULBACTAM SODIUM SCH MLS/HR: 1; .5 INJECTION, POWDER, FOR SOLUTION INTRAMUSCULAR; INTRAVENOUS at 17:21

## 2018-12-28 RX ADMIN — POTASSIUM CHLORIDE SCH MLS/HR: 14.9 INJECTION, SOLUTION INTRAVENOUS at 08:40

## 2018-12-28 RX ADMIN — ISODIUM CHLORIDE SCH MG: 0.03 SOLUTION RESPIRATORY (INHALATION) at 11:20

## 2018-12-28 RX ADMIN — LISINOPRIL SCH MG: 20 TABLET ORAL at 18:50

## 2018-12-28 RX ADMIN — ISODIUM CHLORIDE SCH: 0.03 SOLUTION RESPIRATORY (INHALATION) at 00:53

## 2018-12-28 RX ADMIN — TIMOLOL MALEATE SCH DROPS: 5 SOLUTION OPHTHALMIC at 19:53

## 2018-12-28 RX ADMIN — POTASSIUM CHLORIDE SCH MLS/HR: 14.9 INJECTION, SOLUTION INTRAVENOUS at 17:24

## 2018-12-28 RX ADMIN — ISODIUM CHLORIDE SCH MG: 0.03 SOLUTION RESPIRATORY (INHALATION) at 20:11

## 2018-12-28 RX ADMIN — ISODIUM CHLORIDE SCH: 0.03 SOLUTION RESPIRATORY (INHALATION) at 23:20

## 2018-12-28 RX ADMIN — BRIMONIDINE TARTRATE SCH DROPS: 2 SOLUTION/ DROPS OPHTHALMIC at 19:53

## 2018-12-28 RX ADMIN — TIMOLOL MALEATE SCH DROPS: 5 SOLUTION OPHTHALMIC at 08:40

## 2018-12-28 RX ADMIN — ISODIUM CHLORIDE SCH MG: 0.03 SOLUTION RESPIRATORY (INHALATION) at 16:15

## 2018-12-28 NOTE — CDI
Documentation Clarification Form



Date: 12/28/2018 12:50:50 PM

From: Eileen [email or web address] rivas, CCDS

Phone: [phone]

MRN: [id number]

Admit Date: 12/26/2018 11:04:00 PM

Patient Name: Magdalena Leonard

Visit Number: [id number]

Discharge Date:  





ATTENTION: The Clinical Documentation Specialists (CDI) and [facility] Coding Staff 
appreciate your assistance in clarifying documentation. Please respond to the 
clarification below the line at the bottom and electronically sign. The CDI & 
[facility] Coding staff will review the response and follow-up if needed. Please note: 
Queries are made part of the Legal Health Record. If you have any questions, 
please contact the author of this message via ITS.



Dr. Oscar Chavez:



Atrial Fibrillation is documented in the ED note & History & Physical. Patient 
is on Xarelto for Atrial fibrillation.. 



History/Risk Factors: A Fib, Thrombocytopenia, Hypertension, 



Clinical Indicators: Presented with malaise, nausea & vomiting. 

HR 92 - 82 - 72

EKG/telemetry: R 89 sinus rhythm w/1st deg AV block, Right atrial enlargement, 
Anterior infarct, age undetermined; T wave abn consider inferior ischemia.



Treatment: IV Zofran, IV fluid bolus, IV Rocephin, IV Dextrose/Na Cl

Consults: Hematology re low platelets, ruling out MDS or ITP.



In your professional opinion, can you please clarify the type of Atrial 
Fibrillation, if known?  



    Chronic/Permanent

    Paroxysmal

    Persistent

    Other, please specify

    Unable to determine







(Last Revision: April 2018)

___________________________________________________________________________



MTDD

## 2018-12-29 LAB
ALBUMIN SERPL-MCNC: 2.5 G/DL (ref 3.5–5)
ALP SERPL-CCNC: 98 U/L (ref 38–126)
ALT SERPL-CCNC: 39 U/L (ref 9–52)
ANION GAP SERPL CALC-SCNC: 5 MMOL/L
AST SERPL-CCNC: 63 U/L (ref 14–36)
BUN SERPL-SCNC: 11 MG/DL (ref 7–17)
CALCIUM SPEC-MCNC: 7.9 MG/DL (ref 8.4–10.2)
CELLS COUNTED: 100
CHLORIDE SERPL-SCNC: 108 MMOL/L (ref 98–107)
CO2 SERPL-SCNC: 24 MMOL/L (ref 22–30)
EOSINOPHIL # BLD MANUAL: 0.14 K/UL (ref 0–0.7)
ERYTHROCYTE [DISTWIDTH] IN BLOOD BY AUTOMATED COUNT: 3.28 M/UL (ref 3.8–5.4)
ERYTHROCYTE [DISTWIDTH] IN BLOOD: 14.8 % (ref 11.5–15.5)
GLUCOSE SERPL-MCNC: 161 MG/DL (ref 74–99)
HCT VFR BLD AUTO: 35 % (ref 34–46)
HGB BLD-MCNC: 11.7 GM/DL (ref 11.4–16)
IGA SERPL-MCNC: 668 MG/DL (ref 60–350)
IGG SERPL-MCNC: 2180 MG/DL (ref 700–1600)
IGM SERPL-MCNC: 227 MG/DL (ref 40–280)
LYMPHOCYTES # BLD MANUAL: 0.7 K/UL (ref 1–4.8)
MCH RBC QN AUTO: 35.7 PG (ref 25–35)
MCHC RBC AUTO-ENTMCNC: 33.4 G/DL (ref 31–37)
MCV RBC AUTO: 106.8 FL (ref 80–100)
MONOCYTES # BLD MANUAL: 0.32 K/UL (ref 0–1)
NEUTROPHILS NFR BLD MANUAL: 66 %
NEUTS SEG # BLD MANUAL: 2.3 K/UL (ref 1.3–7.7)
PLATELET # BLD AUTO: 51 K/UL (ref 150–450)
POTASSIUM SERPL-SCNC: 3.9 MMOL/L (ref 3.5–5.1)
PROT SERPL-MCNC: 6.5 G/DL (ref 6.3–8.2)
SODIUM SERPL-SCNC: 137 MMOL/L (ref 137–145)
WBC # BLD AUTO: 3.5 K/UL (ref 3.8–10.6)

## 2018-12-29 RX ADMIN — ISODIUM CHLORIDE SCH: 0.03 SOLUTION RESPIRATORY (INHALATION) at 03:56

## 2018-12-29 RX ADMIN — POTASSIUM CHLORIDE SCH MLS/HR: 14.9 INJECTION, SOLUTION INTRAVENOUS at 00:27

## 2018-12-29 RX ADMIN — AMPICILLIN SODIUM AND SULBACTAM SODIUM SCH MLS/HR: 1; .5 INJECTION, POWDER, FOR SOLUTION INTRAMUSCULAR; INTRAVENOUS at 08:06

## 2018-12-29 RX ADMIN — BRIMONIDINE TARTRATE SCH DROPS: 2 SOLUTION/ DROPS OPHTHALMIC at 08:35

## 2018-12-29 RX ADMIN — ISODIUM CHLORIDE SCH MG: 0.03 SOLUTION RESPIRATORY (INHALATION) at 08:37

## 2018-12-29 RX ADMIN — AMPICILLIN SODIUM AND SULBACTAM SODIUM SCH MLS/HR: 1; .5 INJECTION, POWDER, FOR SOLUTION INTRAMUSCULAR; INTRAVENOUS at 15:38

## 2018-12-29 RX ADMIN — RIVAROXABAN SCH MG: 15 TABLET, FILM COATED ORAL at 15:38

## 2018-12-29 RX ADMIN — ISODIUM CHLORIDE SCH MG: 0.03 SOLUTION RESPIRATORY (INHALATION) at 15:31

## 2018-12-29 RX ADMIN — TIMOLOL MALEATE SCH DROPS: 5 SOLUTION OPHTHALMIC at 21:47

## 2018-12-29 RX ADMIN — LISINOPRIL SCH MG: 20 TABLET ORAL at 08:06

## 2018-12-29 RX ADMIN — TIMOLOL MALEATE SCH DROPS: 5 SOLUTION OPHTHALMIC at 08:36

## 2018-12-29 RX ADMIN — POTASSIUM CHLORIDE SCH MLS/HR: 14.9 INJECTION, SOLUTION INTRAVENOUS at 15:39

## 2018-12-29 RX ADMIN — FAMOTIDINE SCH MG: 20 TABLET, FILM COATED ORAL at 08:06

## 2018-12-29 RX ADMIN — ISODIUM CHLORIDE SCH MG: 0.03 SOLUTION RESPIRATORY (INHALATION) at 11:44

## 2018-12-29 RX ADMIN — ISODIUM CHLORIDE SCH: 0.03 SOLUTION RESPIRATORY (INHALATION) at 20:47

## 2018-12-29 RX ADMIN — AMPICILLIN SODIUM AND SULBACTAM SODIUM SCH MLS/HR: 1; .5 INJECTION, POWDER, FOR SOLUTION INTRAMUSCULAR; INTRAVENOUS at 00:24

## 2018-12-29 RX ADMIN — BRIMONIDINE TARTRATE SCH DROPS: 2 SOLUTION/ DROPS OPHTHALMIC at 21:47

## 2018-12-29 RX ADMIN — ISODIUM CHLORIDE SCH MG: 0.03 SOLUTION RESPIRATORY (INHALATION) at 23:32

## 2018-12-29 RX ADMIN — POTASSIUM CHLORIDE SCH MLS/HR: 14.9 INJECTION, SOLUTION INTRAVENOUS at 08:05

## 2018-12-29 NOTE — PN
PROGRESS NOTE



CHIEF COMPLAINT:

Urinary tract infection, pancytopenia and hypertension.



HISTORY OF PRESENT ILLNESS:

This lady is doing a little bit better.  Blood pressure is down.  She is feeling a

little bit better.  She has no fever, chills, nausea, abdominal pain, etc.



PHYSICAL EXAM:

Chest is clear.  Cardiac exam is normal.  Abdomen is soft and nontender.  She is a

little bit wheezy.



IMPRESSION:

1. Urinary tract infection.

2. Hypertension.

3. Mild bronchospasm.



PLAN:

No change in program at this time, except to possibly start updrafts if she continues

to have difficulty.





MMODL / IJN: [id number] / Job#: [id number]

## 2018-12-29 NOTE — P.PN
Subjective


Progress Note Date: 12/29/18


Principal diagnosis: 





pancytopenia





Platlet count greater than 50K, will restart Xarelto and check CMP 





Objective





- Vital Signs


Vital signs: 


 Vital Signs











Temp  98.9 F   12/29/18 06:00


 


Pulse  72   12/29/18 11:59


 


Resp  16   12/29/18 11:49


 


BP  156/75   12/29/18 06:00


 


Pulse Ox  94 L  12/29/18 06:00








 Intake & Output











 12/28/18 12/29/18 12/29/18





 18:59 06:59 18:59


 


Intake Total 900 200 200


 


Balance 900 200 200


 


Weight 73.9 kg  


 


Intake:   


 


  Oral 900 200 200


 


Other:   


 


  # Voids 2 2 














- Exam








- Constitutional


General appearance: cooperative, no acute distress





- EENT


Eyes: EOMI, dentition normal


ENT: hard of hearing, NA/AT, normal oropharynx





- Neck





no lymphadenopathy


Neck: normal ROM





- Respiratory


Respiratory: bilateral: diminished (bases), expiratory wheezes noted





- Cardiovascular


Rhythm: irregularly irregular





- Gastrointestinal


General gastrointestinal: normal bowel sounds, soft





- Integumentary


Integumentary: pale





- Neurologic


Neurologic: CNII-XII intact





- Musculoskeletal


Musculoskeletal: generalized weakness, strength equal bilaterally





- Psychiatric


Psychiatric: appropriate affect








- Labs


CBC & Chem 7: 


 12/29/18 07:59





 12/29/18 14:37


Labs: 


 Abnormal Lab Results - Last 24 Hours (Table)











  12/27/18 12/29/18 Range/Units





  14:58 07:59 


 


WBC   3.5 L  (3.8-10.6)  k/uL


 


RBC   3.28 L  (3.80-5.40)  m/uL


 


MCV   106.8 H  (80.0-100.0)  fL


 


MCH   35.7 H  (25.0-35.0)  pg


 


Plt Count   51 L  (150-450)  k/uL


 


Lymphocytes # (Manual)   0.70 L  (1.0-4.8)  k/uL


 


IgG  2180.0 H   (700.0-1600.0)  mg/dL


 


IgA  668.0 H   (60.0-350.0)  mg/dL








 Microbiology - Last 24 Hours (Table)











 12/26/18 00:33 Urine Culture - Final





 Urine,Clean Catch    Staphylococcus aureus














Assessment and Plan


Plan: 





Assessment and Recommendations:





1. Pancytopenia:


Leukopenia: with lymphocytopenia (intermittent since 2016)


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


 - Await full Work-up


Thrombocytopenia:


 - Known History of clumping platlets and underestimated platlet count when not 

drawn immediately in citrate tube and ran. Will attempt to re-drawn in am 12-28

, and run immediately. Although with concurrent acute infection cannot rule out 

accurate thrombocytopenia as her history has a mild thrombocytopenia at 

baseline and an infection may contribute to worsening of this. 


 - Re-work-up other causes (greater than 2 years, last 2016)


 - Redraw in citrate tube and run immediately again on 12/30/18, in mean time 

may resume xarelto as platlet count is greater than 50K today


 - Would monitor to ensure this remains above 50K for next 24-48 hours. 


 - Discussed in full with patient today


 - Encourage patient to increase activity, with assistance, if PT is not 

already following may benefot from consultation to decrease debility and muscle 

atrophy, also recommend incentive spirometer to bedside. 





Physcian Attestation: I have completed the full history and physical of this 

patient and agree with above dictation by Savannah Angela NP Dictated as a 

scribe. 





We will continue to follow.

## 2018-12-29 NOTE — PN
PROGRESS NOTE



DATE OF SERVICE:

12/28/2018.



CHIEF COMPLAINT:

Urinary tract infection, pancytopenia and hypertension.



HISTORY OF PRESENT ILLNESS:

This lady is doing fairly well and she is improving as she is rehydrated.  Blood

pressure did rise and this will be treated.



PHYSICAL EXAM:

Color is better and hydration is improved.  Chest is clear.  Cardiac exam is normal.

Abdomen is soft, nontender.



IMPRESSION:

1. Urinary tract infection.

2. Septicemia.

3. Pancytopenia.

4. Hypertension.



PLAN:

1. Continue evaluation of her pancytopenia.

2. Continue with IV antibiotics.

3. Continue with rehydration.

4. Treat hypertension.





MMODL / IJN: [id number] / Job#: [id number]

## 2018-12-30 LAB
CELLS COUNTED: 100
EOSINOPHIL # BLD MANUAL: 0.13 K/UL (ref 0–0.7)
ERYTHROCYTE [DISTWIDTH] IN BLOOD BY AUTOMATED COUNT: 3.08 M/UL (ref 3.8–5.4)
ERYTHROCYTE [DISTWIDTH] IN BLOOD: 15 % (ref 11.5–15.5)
HCT VFR BLD AUTO: 33.2 % (ref 34–46)
HGB BLD-MCNC: 10.8 GM/DL (ref 11.4–16)
LYMPHOCYTES # BLD MANUAL: 0.53 K/UL (ref 1–4.8)
MCH RBC QN AUTO: 35 PG (ref 25–35)
MCHC RBC AUTO-ENTMCNC: 32.4 G/DL (ref 31–37)
MCV RBC AUTO: 107.9 FL (ref 80–100)
MONOCYTES # BLD MANUAL: 0.57 K/UL (ref 0–1)
NEUTROPHILS NFR BLD MANUAL: 71 %
NEUTS SEG # BLD MANUAL: 3.1 K/UL (ref 1.3–7.7)
PLATELET # BLD AUTO: 46 K/UL (ref 150–450)
WBC # BLD AUTO: 4.4 K/UL (ref 3.8–10.6)

## 2018-12-30 RX ADMIN — AMPICILLIN SODIUM AND SULBACTAM SODIUM SCH MLS/HR: 1; .5 INJECTION, POWDER, FOR SOLUTION INTRAMUSCULAR; INTRAVENOUS at 00:32

## 2018-12-30 RX ADMIN — TIMOLOL MALEATE SCH DROPS: 5 SOLUTION OPHTHALMIC at 20:55

## 2018-12-30 RX ADMIN — AMPICILLIN SODIUM AND SULBACTAM SODIUM SCH MLS/HR: 1; .5 INJECTION, POWDER, FOR SOLUTION INTRAMUSCULAR; INTRAVENOUS at 08:03

## 2018-12-30 RX ADMIN — POTASSIUM CHLORIDE SCH MLS/HR: 14.9 INJECTION, SOLUTION INTRAVENOUS at 15:35

## 2018-12-30 RX ADMIN — RIVAROXABAN SCH MG: 15 TABLET, FILM COATED ORAL at 08:05

## 2018-12-30 RX ADMIN — ONDANSETRON PRN MG: 2 INJECTION INTRAMUSCULAR; INTRAVENOUS at 03:55

## 2018-12-30 RX ADMIN — POTASSIUM CHLORIDE SCH MLS/HR: 14.9 INJECTION, SOLUTION INTRAVENOUS at 03:54

## 2018-12-30 RX ADMIN — BRIMONIDINE TARTRATE SCH DROPS: 2 SOLUTION/ DROPS OPHTHALMIC at 08:04

## 2018-12-30 RX ADMIN — BRIMONIDINE TARTRATE SCH DROPS: 2 SOLUTION/ DROPS OPHTHALMIC at 20:55

## 2018-12-30 RX ADMIN — FAMOTIDINE SCH MG: 20 TABLET, FILM COATED ORAL at 08:05

## 2018-12-30 RX ADMIN — ISODIUM CHLORIDE SCH MG: 0.03 SOLUTION RESPIRATORY (INHALATION) at 03:37

## 2018-12-30 RX ADMIN — POTASSIUM CHLORIDE SCH MLS/HR: 14.9 INJECTION, SOLUTION INTRAVENOUS at 08:03

## 2018-12-30 RX ADMIN — ISODIUM CHLORIDE SCH MG: 0.03 SOLUTION RESPIRATORY (INHALATION) at 11:21

## 2018-12-30 RX ADMIN — ISODIUM CHLORIDE SCH MG: 0.03 SOLUTION RESPIRATORY (INHALATION) at 07:19

## 2018-12-30 RX ADMIN — LISINOPRIL SCH MG: 20 TABLET ORAL at 08:05

## 2018-12-30 RX ADMIN — ISODIUM CHLORIDE SCH MG: 0.03 SOLUTION RESPIRATORY (INHALATION) at 16:09

## 2018-12-30 RX ADMIN — AMPICILLIN SODIUM AND SULBACTAM SODIUM SCH MLS/HR: 1; .5 INJECTION, POWDER, FOR SOLUTION INTRAMUSCULAR; INTRAVENOUS at 15:35

## 2018-12-30 RX ADMIN — ISODIUM CHLORIDE SCH MG: 0.03 SOLUTION RESPIRATORY (INHALATION) at 21:01

## 2018-12-30 RX ADMIN — TIMOLOL MALEATE SCH DROPS: 5 SOLUTION OPHTHALMIC at 08:04

## 2018-12-30 NOTE — MISC
MISCELLANOUS REPORT



Atrial fibrillation is chronic and permanent.





MMODL / IJN: [id number] / Job#: [id number]

## 2018-12-30 NOTE — PN
PROGRESS NOTE



DATE OF SERVICE:

12/30/2018



CHIEF COMPLAINT:

Dehydration, urinary tract infection and pancytopenia.



HISTORY OF PRESENT ILLNESS:

This lady seems to be doing fairly well and bone marrow status is fairly stable.  If

things continue along these lines, she could probably go home in the next day or 2.



PHYSICAL EXAM:

She remains slightly pale.  Her hydration is improved.  Chest is improved.  She is less

short of breath.  Cardiac exam is normal.  Abdomen is soft, nontender.



IMPRESSION:

1. Urinary tract infection.

2. Mild bronchospasm.

3. Pancytopenia.



PLAN:

Continue with current program and if she remains stable, she could probably go home in

a day or 2.  Will also investigate possibility of congestive heart failure.





MMODL / IJN: [id number] / Job#: [id number]

## 2018-12-30 NOTE — P.PN
Subjective


Progress Note Date: 12/30/18


Principal diagnosis: 





pancytopenia





Platlet count 46, today, no s/s of bleeding





Objective





- Vital Signs


Vital signs: 


 Vital Signs











Temp  98.8 F   12/30/18 06:00


 


Pulse  72   12/30/18 11:34


 


Resp  20   12/30/18 06:00


 


BP  153/64   12/30/18 06:00


 


Pulse Ox  95   12/30/18 06:00








 Intake & Output











 12/29/18 12/30/18 12/30/18





 18:59 06:59 18:59


 


Intake Total 400 300 


 


Balance 400 300 


 


Intake:   


 


  Oral 400 300 


 


Other:   


 


  # Voids 5 2 1


 


  # Bowel Movements  0 














- Exam








- Constitutional


General appearance: cooperative, no acute distress





- EENT


Eyes: EOMI, dentition normal


ENT: hard of hearing, NA/AT, normal oropharynx





- Neck





no lymphadenopathy


Neck: normal ROM





- Respiratory


Respiratory: bilateral: diminished (bases), expiratory wheezes noted





- Cardiovascular


Rhythm: irregularly irregular





- Gastrointestinal


General gastrointestinal: normal bowel sounds, soft





- Integumentary


Integumentary: pale





- Neurologic


Neurologic: CNII-XII intact





- Musculoskeletal


Musculoskeletal: generalized weakness, strength equal bilaterally





- Psychiatric


Psychiatric: appropriate affect








- Labs


CBC & Chem 7: 


 12/30/18 06:53





 12/29/18 14:37


Labs: 


 Abnormal Lab Results - Last 24 Hours (Table)











  12/29/18 12/30/18 Range/Units





  14:37 06:53 


 


RBC   3.08 L  (3.80-5.40)  m/uL


 


Hgb   10.8 L  (11.4-16.0)  gm/dL


 


Hct   33.2 L  (34.0-46.0)  %


 


MCV   107.9 H  (80.0-100.0)  fL


 


Plt Count   46 L  (150-450)  k/uL


 


Lymphocytes # (Manual)   0.53 L  (1.0-4.8)  k/uL


 


Chloride  108 H   ()  mmol/L


 


Glucose  161 H   (74-99)  mg/dL


 


Calcium  7.9 L   (8.4-10.2)  mg/dL


 


AST  63 H   (14-36)  U/L


 


Albumin  2.5 L   (3.5-5.0)  g/dL














Assessment and Plan


Plan: 





Assessment and Recommendations:





1. Pancytopenia:


Leukopenia: with lymphocytopenia (intermittent since 2016)


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


 - Await full Work-up


Thrombocytopenia:


 - Known History of clumping platlets and underestimated platlet count when not 

drawn immediately in citrate tube and ran. Will attempt to re-drawn in am 12-28

, and run immediately. Although with concurrent acute infection cannot rule out 

accurate thrombocytopenia as her history has a mild thrombocytopenia at 

baseline and an infection may contribute to worsening of this. 


 - Re-work-up other causes (greater than 2 years, last 2016)


 - Redraw in citrate tube and run immediately again on 12/30/18, in mean time 

may resume xarelto as long as no s/s of bleeding and platelet count remains 

above 45


 - Would monitor to ensure this remains above 50K for next 24-48 hours. 


 - Discussed in full with patient today


 - Encourage patient to increase activity, with assistance, if PT is not 

already following may benefot from consultation to decrease debility and muscle 

atrophy, also recommend incentive spirometer to bedside. 





Physcian Attestation: I have completed the full history and physical of this 

patient and agree with above dictation by Savannah Angela NP Dictated as a 

scribe. 





We will continue to follow.

## 2018-12-31 LAB
ALBUMIN SERPL ELPH-MCNC: 2.48 G/DL (ref 3.8–4.9)
BASOPHILS # BLD MANUAL: 0.04 K/UL (ref 0–0.2)
CELLS COUNTED: 100
EOSINOPHIL # BLD MANUAL: 0.16 K/UL (ref 0–0.7)
ERYTHROCYTE [DISTWIDTH] IN BLOOD BY AUTOMATED COUNT: 3.09 M/UL (ref 3.8–5.4)
ERYTHROCYTE [DISTWIDTH] IN BLOOD: 15.2 % (ref 11.5–15.5)
GAMMA GLOB SERPL ELPH-MCNC: 1.97 G/DL (ref 0.7–1.5)
HCT VFR BLD AUTO: 34.1 % (ref 34–46)
HGB BLD-MCNC: 10.7 GM/DL (ref 11.4–16)
LYMPHOCYTES # BLD MANUAL: 0.82 K/UL (ref 1–4.8)
MCH RBC QN AUTO: 34.5 PG (ref 25–35)
MCHC RBC AUTO-ENTMCNC: 31.2 G/DL (ref 31–37)
MCV RBC AUTO: 110.4 FL (ref 80–100)
MONOCYTES # BLD MANUAL: 0.16 K/UL (ref 0–1)
NEUTROPHILS NFR BLD MANUAL: 63 %
NEUTS SEG # BLD MANUAL: 2.7 K/UL (ref 1.3–7.7)
PLATELET # BLD AUTO: 49 K/UL (ref 150–450)
WBC # BLD AUTO: 3.9 K/UL (ref 3.8–10.6)

## 2018-12-31 RX ADMIN — BRIMONIDINE TARTRATE SCH DROPS: 2 SOLUTION/ DROPS OPHTHALMIC at 12:20

## 2018-12-31 RX ADMIN — AMPICILLIN SODIUM AND SULBACTAM SODIUM SCH MLS/HR: 1; .5 INJECTION, POWDER, FOR SOLUTION INTRAMUSCULAR; INTRAVENOUS at 00:27

## 2018-12-31 RX ADMIN — ISODIUM CHLORIDE SCH MG: 0.03 SOLUTION RESPIRATORY (INHALATION) at 12:37

## 2018-12-31 RX ADMIN — POTASSIUM CHLORIDE SCH: 14.9 INJECTION, SOLUTION INTRAVENOUS at 23:16

## 2018-12-31 RX ADMIN — ISODIUM CHLORIDE SCH MG: 0.03 SOLUTION RESPIRATORY (INHALATION) at 16:32

## 2018-12-31 RX ADMIN — TIMOLOL MALEATE SCH DROPS: 5 SOLUTION OPHTHALMIC at 20:03

## 2018-12-31 RX ADMIN — POTASSIUM CHLORIDE SCH MLS/HR: 14.9 INJECTION, SOLUTION INTRAVENOUS at 16:34

## 2018-12-31 RX ADMIN — FAMOTIDINE SCH MG: 20 TABLET, FILM COATED ORAL at 08:34

## 2018-12-31 RX ADMIN — RIVAROXABAN SCH MG: 15 TABLET, FILM COATED ORAL at 14:57

## 2018-12-31 RX ADMIN — POTASSIUM CHLORIDE SCH MLS/HR: 14.9 INJECTION, SOLUTION INTRAVENOUS at 08:34

## 2018-12-31 RX ADMIN — AMPICILLIN SODIUM AND SULBACTAM SODIUM SCH MLS/HR: 1; .5 INJECTION, POWDER, FOR SOLUTION INTRAMUSCULAR; INTRAVENOUS at 16:34

## 2018-12-31 RX ADMIN — ISODIUM CHLORIDE SCH MG: 0.03 SOLUTION RESPIRATORY (INHALATION) at 09:27

## 2018-12-31 RX ADMIN — BRIMONIDINE TARTRATE SCH DROPS: 2 SOLUTION/ DROPS OPHTHALMIC at 20:03

## 2018-12-31 RX ADMIN — TIMOLOL MALEATE SCH DROPS: 5 SOLUTION OPHTHALMIC at 12:20

## 2018-12-31 RX ADMIN — POTASSIUM CHLORIDE SCH: 14.9 INJECTION, SOLUTION INTRAVENOUS at 04:06

## 2018-12-31 RX ADMIN — ISODIUM CHLORIDE SCH MG: 0.03 SOLUTION RESPIRATORY (INHALATION) at 03:40

## 2018-12-31 RX ADMIN — ISODIUM CHLORIDE SCH: 0.03 SOLUTION RESPIRATORY (INHALATION) at 20:40

## 2018-12-31 RX ADMIN — ISODIUM CHLORIDE SCH MG: 0.03 SOLUTION RESPIRATORY (INHALATION) at 00:09

## 2018-12-31 RX ADMIN — AMPICILLIN SODIUM AND SULBACTAM SODIUM SCH MLS/HR: 1; .5 INJECTION, POWDER, FOR SOLUTION INTRAMUSCULAR; INTRAVENOUS at 08:33

## 2018-12-31 RX ADMIN — LISINOPRIL SCH MG: 20 TABLET ORAL at 08:34

## 2018-12-31 RX ADMIN — AMPICILLIN SODIUM AND SULBACTAM SODIUM SCH MLS/HR: 1; .5 INJECTION, POWDER, FOR SOLUTION INTRAMUSCULAR; INTRAVENOUS at 23:15

## 2019-01-01 LAB
ERYTHROCYTE [DISTWIDTH] IN BLOOD BY AUTOMATED COUNT: 3.27 M/UL (ref 3.8–5.4)
ERYTHROCYTE [DISTWIDTH] IN BLOOD: 14.8 % (ref 11.5–15.5)
HCT VFR BLD AUTO: 36 % (ref 34–46)
HGB BLD-MCNC: 11.3 GM/DL (ref 11.4–16)
MCH RBC QN AUTO: 34.6 PG (ref 25–35)
MCHC RBC AUTO-ENTMCNC: 31.5 G/DL (ref 31–37)
MCV RBC AUTO: 110 FL (ref 80–100)
PLATELET # BLD AUTO: 37 K/UL (ref 150–450)
WBC # BLD AUTO: 4.1 K/UL (ref 3.8–10.6)

## 2019-01-01 RX ADMIN — AMPICILLIN SODIUM AND SULBACTAM SODIUM SCH MLS/HR: 1; .5 INJECTION, POWDER, FOR SOLUTION INTRAMUSCULAR; INTRAVENOUS at 15:52

## 2019-01-01 RX ADMIN — LISINOPRIL SCH MG: 20 TABLET ORAL at 08:34

## 2019-01-01 RX ADMIN — TIMOLOL MALEATE SCH DROPS: 5 SOLUTION OPHTHALMIC at 08:34

## 2019-01-01 RX ADMIN — ISODIUM CHLORIDE SCH MG: 0.03 SOLUTION RESPIRATORY (INHALATION) at 07:47

## 2019-01-01 RX ADMIN — ISODIUM CHLORIDE SCH MG: 0.03 SOLUTION RESPIRATORY (INHALATION) at 23:11

## 2019-01-01 RX ADMIN — TIMOLOL MALEATE SCH DROPS: 5 SOLUTION OPHTHALMIC at 20:43

## 2019-01-01 RX ADMIN — ISODIUM CHLORIDE SCH MG: 0.03 SOLUTION RESPIRATORY (INHALATION) at 01:30

## 2019-01-01 RX ADMIN — POTASSIUM CHLORIDE SCH: 14.9 INJECTION, SOLUTION INTRAVENOUS at 15:52

## 2019-01-01 RX ADMIN — POTASSIUM CHLORIDE SCH: 14.9 INJECTION, SOLUTION INTRAVENOUS at 08:35

## 2019-01-01 RX ADMIN — ISODIUM CHLORIDE SCH MG: 0.03 SOLUTION RESPIRATORY (INHALATION) at 19:44

## 2019-01-01 RX ADMIN — BRIMONIDINE TARTRATE SCH DROPS: 2 SOLUTION/ DROPS OPHTHALMIC at 20:43

## 2019-01-01 RX ADMIN — BRIMONIDINE TARTRATE SCH DROPS: 2 SOLUTION/ DROPS OPHTHALMIC at 08:35

## 2019-01-01 RX ADMIN — ISODIUM CHLORIDE SCH MG: 0.03 SOLUTION RESPIRATORY (INHALATION) at 11:24

## 2019-01-01 RX ADMIN — ISODIUM CHLORIDE SCH MG: 0.03 SOLUTION RESPIRATORY (INHALATION) at 15:56

## 2019-01-01 RX ADMIN — ISODIUM CHLORIDE SCH: 0.03 SOLUTION RESPIRATORY (INHALATION) at 03:40

## 2019-01-01 RX ADMIN — RIVAROXABAN SCH: 15 TABLET, FILM COATED ORAL at 10:24

## 2019-01-01 RX ADMIN — AMPICILLIN SODIUM AND SULBACTAM SODIUM SCH MLS/HR: 1; .5 INJECTION, POWDER, FOR SOLUTION INTRAMUSCULAR; INTRAVENOUS at 08:34

## 2019-01-01 RX ADMIN — FAMOTIDINE SCH MG: 20 TABLET, FILM COATED ORAL at 08:34

## 2019-01-01 NOTE — PN
PROGRESS NOTE



CHIEF COMPLAINT:

Urinary tract infection and general malaise and debility.



HISTORY OF PRESENT ILLNESS:

This lady still remains quite weak.  Bone marrow function has been stable.  She denies

fever, chills, cough, abdominal pain, shortness of breath, chest pain, etc.



PHYSICAL EXAM:

She is somewhat lethargic.  Chest is clear.  The cardiac exam is normal and.  Abdomen

is soft and nontender.



IMPRESSION:

1. Urinary tract infection.

2. Pancytopenia.

3. Elevated blood pressure.

4. Failure to thrive.



PLAN:

We will hold her in the hospital for another day to see if she improves enough to be

able to go home.





MMODL / IJN: [id number] / Job#: [id number]

## 2019-01-01 NOTE — PN
PROGRESS NOTE



CHIEF COMPLAINT:

Urinary tract infection with general debility and weakness, as well as pancytopenia.



HISTORY OF PRESENT ILLNESS:

This lady still is lethargic and weak.  Appetite is poor.  She feels that she must go

to a nursing home now for rehab.



PHYSICAL EXAM:

She remains pale and weak.  Chest is clear.  Cardiac exam is normal.  Abdomen is soft,

nontender.



IMPRESSION:

1. General debility and failure to thrive.

2. Urinary tract infection.

3. Pancytopenia.



PLAN:

Refer for discharge planning for nursing home.





MMODL / IJN: [id number] / Job#: [id number]

## 2019-01-02 LAB
CELLS COUNTED: 100
EOSINOPHIL # BLD MANUAL: 0.23 K/UL (ref 0–0.7)
ERYTHROCYTE [DISTWIDTH] IN BLOOD BY AUTOMATED COUNT: 3.27 M/UL (ref 3.8–5.4)
ERYTHROCYTE [DISTWIDTH] IN BLOOD: 14.9 % (ref 11.5–15.5)
HCT VFR BLD AUTO: 36.4 % (ref 34–46)
HGB BLD-MCNC: 11.3 GM/DL (ref 11.4–16)
LYMPHOCYTES # BLD MANUAL: 0.87 K/UL (ref 1–4.8)
MCH RBC QN AUTO: 34.4 PG (ref 25–35)
MCHC RBC AUTO-ENTMCNC: 30.9 G/DL (ref 31–37)
MCV RBC AUTO: 111.2 FL (ref 80–100)
MONOCYTES # BLD MANUAL: 0.19 K/UL (ref 0–1)
NEUTROPHILS NFR BLD MANUAL: 66 %
NEUTS SEG # BLD MANUAL: 2.51 K/UL (ref 1.3–7.7)
PLATELET # BLD AUTO: 50 K/UL (ref 150–450)
WBC # BLD AUTO: 3.8 K/UL (ref 3.8–10.6)

## 2019-01-02 RX ADMIN — POTASSIUM CHLORIDE SCH: 14.9 INJECTION, SOLUTION INTRAVENOUS at 08:21

## 2019-01-02 RX ADMIN — ISODIUM CHLORIDE SCH MG: 0.03 SOLUTION RESPIRATORY (INHALATION) at 03:32

## 2019-01-02 RX ADMIN — RIVAROXABAN SCH MG: 15 TABLET, FILM COATED ORAL at 11:39

## 2019-01-02 RX ADMIN — FAMOTIDINE SCH MG: 20 TABLET, FILM COATED ORAL at 08:21

## 2019-01-02 RX ADMIN — ISODIUM CHLORIDE SCH MG: 0.03 SOLUTION RESPIRATORY (INHALATION) at 08:35

## 2019-01-02 RX ADMIN — POTASSIUM CHLORIDE SCH: 14.9 INJECTION, SOLUTION INTRAVENOUS at 16:45

## 2019-01-02 RX ADMIN — LISINOPRIL SCH MG: 20 TABLET ORAL at 08:21

## 2019-01-02 RX ADMIN — TIMOLOL MALEATE SCH DROPS: 5 SOLUTION OPHTHALMIC at 08:28

## 2019-01-02 RX ADMIN — POTASSIUM CHLORIDE SCH: 14.9 INJECTION, SOLUTION INTRAVENOUS at 00:46

## 2019-01-02 RX ADMIN — ISODIUM CHLORIDE SCH: 0.03 SOLUTION RESPIRATORY (INHALATION) at 16:01

## 2019-01-02 RX ADMIN — BRIMONIDINE TARTRATE SCH DROPS: 2 SOLUTION/ DROPS OPHTHALMIC at 08:28

## 2019-01-02 RX ADMIN — AMPICILLIN SODIUM AND SULBACTAM SODIUM SCH MLS/HR: 1; .5 INJECTION, POWDER, FOR SOLUTION INTRAMUSCULAR; INTRAVENOUS at 15:49

## 2019-01-02 RX ADMIN — BRIMONIDINE TARTRATE SCH DROPS: 2 SOLUTION/ DROPS OPHTHALMIC at 21:27

## 2019-01-02 RX ADMIN — ISODIUM CHLORIDE SCH MG: 0.03 SOLUTION RESPIRATORY (INHALATION) at 23:36

## 2019-01-02 RX ADMIN — ISODIUM CHLORIDE SCH MG: 0.03 SOLUTION RESPIRATORY (INHALATION) at 20:20

## 2019-01-02 RX ADMIN — AMPICILLIN SODIUM AND SULBACTAM SODIUM SCH MLS/HR: 1; .5 INJECTION, POWDER, FOR SOLUTION INTRAMUSCULAR; INTRAVENOUS at 00:11

## 2019-01-02 RX ADMIN — TIMOLOL MALEATE SCH DROPS: 5 SOLUTION OPHTHALMIC at 21:26

## 2019-01-02 RX ADMIN — ISODIUM CHLORIDE SCH MG: 0.03 SOLUTION RESPIRATORY (INHALATION) at 12:18

## 2019-01-02 RX ADMIN — AMPICILLIN SODIUM AND SULBACTAM SODIUM SCH MLS/HR: 1; .5 INJECTION, POWDER, FOR SOLUTION INTRAMUSCULAR; INTRAVENOUS at 08:21

## 2019-01-02 RX ADMIN — FAMOTIDINE SCH MG: 20 TABLET, FILM COATED ORAL at 21:23

## 2019-01-02 NOTE — P.PN
Subjective


Progress Note Date: 01/02/19


Principal diagnosis: 





UTI, thrombocytopenia and need for anticoagulation





Pt seen in f/u, she is feeling better today, no fevers, she has a dry cough, 

denies bleeding or pain.  





Objective





- Vital Signs


Vital signs: 


 Vital Signs











Temp  98.5 F   01/02/19 07:00


 


Pulse  80   01/02/19 12:28


 


Resp  15   01/02/19 07:00


 


BP  126/70   01/02/19 08:20


 


Pulse Ox  94 L  01/02/19 07:00








 Intake & Output











 01/01/19 01/02/19 01/02/19





 18:59 06:59 18:59


 


Intake Total 240 150 


 


Balance 240 150 


 


Intake:   


 


  Intake, IV Titration  50 





  Amount   


 


    Ampicillin-Sulbactam 1.5  50 





    gm In Sodium Chloride 0.9   





    % 50 ml @ 100 mls/hr IVPB   





    Q8HR ROBERT Rx#:[id number]   


 


  Oral 240 100 


 


Other:   


 


  # Voids 4 1 


 


  # Bowel Movements 0  














- Constitutional


General appearance: Present: average body habitus, cooperative, no acute 

distress





- EENT


Eyes: Present: anicteric sclerae





- Respiratory


Respiratory: bilateral: CTA





- Cardiovascular


Heart sounds: normal: S1, S2





- Gastrointestinal


General gastrointestinal: Present: normal bowel sounds, soft





- Integumentary


Integumentary: Present: normal





- Neurologic


Neurologic: Present: CNII-XII intact





- Musculoskeletal


Musculoskeletal: Present: generalized weakness, strength equal bilaterally





- Psychiatric


Psychiatric: Present: A&O x's 3, appropriate affect, intact judgment & insight





- Labs


CBC & Chem 7: 


 01/02/19 10:12





 12/29/18 14:37


Labs: 


 Abnormal Lab Results - Last 24 Hours (Table)











  01/02/19 Range/Units





  10:12 


 


RBC  3.27 L  (3.80-5.40)  m/uL


 


Hgb  11.3 L  (11.4-16.0)  gm/dL


 


MCV  111.2 H  (80.0-100.0)  fL


 


MCHC  30.9 L  (31.0-37.0)  g/dL


 


Plt Count  50 L  (150-450)  k/uL


 


Lymphocytes # (Manual)  0.87 L  (1.0-4.8)  k/uL














Assessment and Plan


(1) Thrombocytopenia


Narrative/Plan: 


Pt has been clinically diagnosed with early MDS/ITP and has been on monitoring, 

she had not followed up with Dr. Kothari since 8/2017 so, unable to say what her 

most recent baseline is.  She requires anticoagulation and she has been very 

well educated on reporting bleeding.    Her baseline platelet count when she 

was 1st diagnosed, blood had to be collected in citrate tube and ran immediately

, would yield a plt count around 100K, last year that number has trended down 

to the 70K range.  Suspect that the acute drop is going to be related to acute 

illness.  Did suggest that she see Dr. Kothari a week or 2 after discharge to see 

where her platelet count is at, to ensure recovery at least to baseline.  She 

agreed.


Plt 50K today, ok to continue anticoagulation.  Pt told to report immediately 

any s/s bleeding 


Current Visit: Yes   Status: Chronic   Priority: Medium   Code(s): D69.6 - 

THROMBOCYTOPENIA, UNSPECIFIED   SNOMED Code(s): 886826931

## 2019-01-03 VITALS
HEART RATE: 73 BPM | RESPIRATION RATE: 16 BRPM | TEMPERATURE: 98.2 F | DIASTOLIC BLOOD PRESSURE: 58 MMHG | SYSTOLIC BLOOD PRESSURE: 131 MMHG

## 2019-01-03 LAB
CELLS COUNTED: 100
EOSINOPHIL # BLD MANUAL: 0.19 K/UL (ref 0–0.7)
ERYTHROCYTE [DISTWIDTH] IN BLOOD BY AUTOMATED COUNT: 3.39 M/UL (ref 3.8–5.4)
ERYTHROCYTE [DISTWIDTH] IN BLOOD: 14.9 % (ref 11.5–15.5)
HCT VFR BLD AUTO: 37 % (ref 34–46)
HGB BLD-MCNC: 11.7 GM/DL (ref 11.4–16)
LYMPHOCYTES # BLD MANUAL: 0.93 K/UL (ref 1–4.8)
MCH RBC QN AUTO: 34.7 PG (ref 25–35)
MCHC RBC AUTO-ENTMCNC: 31.7 G/DL (ref 31–37)
MCV RBC AUTO: 109.3 FL (ref 80–100)
MONOCYTES # BLD MANUAL: 0.3 K/UL (ref 0–1)
NEUTROPHILS NFR BLD MANUAL: 62 %
NEUTS SEG # BLD MANUAL: 2.29 K/UL (ref 1.3–7.7)
PLATELET # BLD AUTO: 59 K/UL (ref 150–450)
WBC # BLD AUTO: 3.7 K/UL (ref 3.8–10.6)

## 2019-01-03 RX ADMIN — ISODIUM CHLORIDE SCH MG: 0.03 SOLUTION RESPIRATORY (INHALATION) at 11:33

## 2019-01-03 RX ADMIN — AMPICILLIN SODIUM AND SULBACTAM SODIUM SCH MLS/HR: 1; .5 INJECTION, POWDER, FOR SOLUTION INTRAMUSCULAR; INTRAVENOUS at 01:21

## 2019-01-03 RX ADMIN — BRIMONIDINE TARTRATE SCH DROPS: 2 SOLUTION/ DROPS OPHTHALMIC at 08:35

## 2019-01-03 RX ADMIN — LISINOPRIL SCH MG: 20 TABLET ORAL at 08:35

## 2019-01-03 RX ADMIN — AMPICILLIN SODIUM AND SULBACTAM SODIUM SCH: 1; .5 INJECTION, POWDER, FOR SOLUTION INTRAMUSCULAR; INTRAVENOUS at 08:37

## 2019-01-03 RX ADMIN — FAMOTIDINE SCH MG: 20 TABLET, FILM COATED ORAL at 08:34

## 2019-01-03 RX ADMIN — ISODIUM CHLORIDE SCH: 0.03 SOLUTION RESPIRATORY (INHALATION) at 04:16

## 2019-01-03 RX ADMIN — RIVAROXABAN SCH MG: 15 TABLET, FILM COATED ORAL at 09:51

## 2019-01-03 RX ADMIN — POTASSIUM CHLORIDE SCH: 14.9 INJECTION, SOLUTION INTRAVENOUS at 08:34

## 2019-01-03 RX ADMIN — ISODIUM CHLORIDE SCH MG: 0.03 SOLUTION RESPIRATORY (INHALATION) at 07:36

## 2019-01-03 RX ADMIN — POTASSIUM CHLORIDE SCH: 14.9 INJECTION, SOLUTION INTRAVENOUS at 00:47

## 2019-01-03 RX ADMIN — TIMOLOL MALEATE SCH DROPS: 5 SOLUTION OPHTHALMIC at 08:35

## 2019-01-03 NOTE — DS
DISCHARGE SUMMARY



CHIEF COMPLAINT:

Urinary tract infection, septicemia.



HISTORY OF PRESENT ILLNESS AND PHYSICAL EXAM:

Details of this lady's history and physical can be found in the initial workup.



LABORATORY STUDIES:

While she was in a hospital, she had laboratory studies, details of which can be found

in the laboratory section of the chart.



COURSE IN THE HOSPITAL:

After admission, she was placed on bedrest and started on intravenous fluids and IV

antibiotics.  She improved.  She had difficulty with hypertension and she remained

lethargic and could not ambulate.  She also did have a significant thrombocytopenia for

which she was seen by Hematology.  It is felt that she may be developing

myeloproliferative disorder.  Because she cannot ambulate, she will be discharged to

[facility] for physical therapy.



FINAL DIAGNOSES:

1. Urinary tract infection.

2. Septicemia.

3. Hypertension.

4. Delirium.

5. General debility.

6. Pancytopenia.

7. Myeloproliferative disorder.



OPERATIONS:

None.



CONSULTATIONS:

Hematology.



She is improved.





MMODL / IJN: [id number] / Job#: [id number]

## 2019-01-03 NOTE — PN
PROGRESS NOTE



DATE OF SERVICE:

01/02/2019



CHIEF COMPLAINT:

Urinary tract infection, sepsis, pancytopenia and general debility.



HISTORY OF PRESENT ILLNESS:

This lady still is unable to walk.  Temperature has been down.



PHYSICAL EXAM:

Chest is clear and the abdomen is soft, nontender.



IMPRESSION:

1. Septicemia.

2. Urinary tract infection.

3. Pancytopenia.

4. Possible myelodysplastic syndrome.

5. Inability to ambulate.

6. Hypertension.



PLAN:

We are working on nursing home placement.





MMODL / IJN: [id number] / Job#: [id number]

## 2019-07-12 ENCOUNTER — HOSPITAL ENCOUNTER (OUTPATIENT)
Dept: HOSPITAL 47 - WWCWWP | Age: 84
End: 2019-07-12
Attending: SURGERY
Payer: MEDICARE

## 2019-07-12 VITALS — BODY MASS INDEX: 27.8 KG/M2

## 2019-07-12 VITALS
SYSTOLIC BLOOD PRESSURE: 146 MMHG | DIASTOLIC BLOOD PRESSURE: 69 MMHG | HEART RATE: 69 BPM | RESPIRATION RATE: 18 BRPM | TEMPERATURE: 98 F

## 2019-07-12 DIAGNOSIS — Z53.9: Primary | ICD-10-CM

## 2019-07-12 NOTE — P.GSHP
History of Present Illness


H&P Date: 19


Chief Complaint: Bloody left nipple discharge





 The patient is an 89 year old white female with a complaint of left breast 

bloody nipple discharge.  This occurred approximately 2 weeks ago and was 

persistent for approximately 2 days.  He has since subsided.  She has not had a 

mammogram for several years.  The patient has no lumps in her breasts.  She has 

no history of any trauma or infection the breast.  She is complaining of any 

pain in the breast.  Of concern is the fact that she did have some vaginal 

bloody discharge over the past several days.





Family History:


mother: ovarian cancer at 26,  at 26


 3 maternal aunt: breast cancer, esophageal cancer, lung cancer


father: lung cancer





Hormonal History:


menarche: 11


, breast fed: yes, first at 23


BCP: none


Hormones: none





Past surgical history:


1.  Gallbladder


2.  thyroid resection








Medical history:


1.colonoscopy and EGD done last week, negative


[facility]


3. pacemaker\atrial fibrillation, patient is on xeralto








Social history:


Smoke: Negative


Alcohol: Negative


Drugs: Negative








- Constitutional


Constitutional: Denies chills, Denies fever





- EENT


Comment: 





macular degeneration


Eyes: bilateral blurred vision


Ears: deny: decreased hearing, tinnitus


Ears, nose, mouth and throat: Denies headache, Denies sore throat





- Breasts


Breasts: bilateral: as per HPI





- Cardiovascular


Cardiovascular: Denies chest pain, Denies shortness of breath





- Respiratory


Respiratory: Denies cough, Denies 7





- Gastrointestinal


Gastrointestinal: Reports diarrhea, Denies abdominal pain, Denies nausea, Denies

vomiting





- Genitourinary (Female)


Comment: 





uterine prolapse





- Menstruation


Menstruation: Reports postmenopausal





- Musculoskeletal


Comment: 





arthritis





- Integumentary


Integumentary: Denies pruritus, Denies rash





- Neurological


Neurological: Reports weakness





- Psychiatric


Psychiatric: Denies anxiety, Denies depression





- Endocrine


Endocrine: Denies fatigue, Denies weight change





- Hematologic/Lymphatic


Comment: 





Xeralto





- Allergic/Immunologic


Allergic/Immunologic: Reports as per HPI





Past Medical History


Past Medical History: Atrial Fibrillation, Chest Pain / Angina, Hyperlipidemia, 

Hypertension, Syncope


Additional Past Medical History / Comment(s): hypokalemia, 1st degree heart 

block, macular degeneration, glaucoma


History of Any Multi-Drug Resistant Organisms: None Reported


Past Surgical History: Cholecystectomy, Pacemaker


Additional Past Surgical History / Comment(s): thyroidectomy


Past Anesthesia/Blood Transfusion Reactions: No Reported Reaction


Additional Past Anesthesia/Blood Transfusion Reaction / Comment(s): NEVER 

RECIEVED BLOOD.


Type of Cardiac Device: Permanent Pacemaker


Device Placement Date:: 


Past Psychological History: No Psychological Hx Reported, Depression


Additional Psychological History / Comment(s): PT LIVES CURRENTLY WITH [facility]) AND [place] .  NORMALLY BEFORE THIS WEAKNESS SHE HAS BEEN 

INDEPENDENT WITH HER OWN ADL'S. SHE HAS VOLUNTARILY GIVEN UP DRIVING.


Smoking Status: Former smoker


Past Alcohol Use History: Rare


Past Drug Use History: None Reported





- Past Family History


  ** Father


Family Medical History: Cancer, COPD


Additional Family Medical History / Comment(s): FATHER  OF LUNG CA





  ** Mother


Family Medical History: Cancer


Additional Family Medical History / Comment(s): MOTHER  OF SOME FORM OF CA 

WHEN PT WAS 3 YRS OLD.





Medications and Allergies


                                Home Medications











 Medication  Instructions  Recorded  Confirmed  Type


 


Rivaroxaban [Xarelto] 15 mg PO DAILY 18 History


 


Brimonidine Tartrate/Timolol 1 drop BOTH EYES BID 18 History





[Combigan 0.2%-0.5% Eye Drops]    


 


Furosemide [Lasix] 20 mg PO DAILY 19 History


 


Potassium Chloride [Klor-Con 20 20 meq PO DAILY 19 History





Packets]    








                                    Allergies











Allergy/AdvReac Type Severity Reaction Status Date / Time


 


morphine AdvReac Intermediate Nausea & Verified 19 10:09





   Vomiting  


 


Sulfa (Sulfonamide AdvReac Intermediate Nausea & Verified 19 10:09





Antibiotics)   Vomiting  


 


moxifloxacin [From Avelox] AdvReac  Unknown Unverified 19 10:09


 


Statins-Hmg-Coa Reductase AdvReac  Nausea Verified 19 10:09





Inhibitor     


 


sulfamethoxazole AdvReac  Unknown Unverified 19 10:09





[From Bactrim]     


 


trimethoprim [From Bactrim] AdvReac  Unknown Unverified 19 10:09














Surgical - Exam


                                   Vital Signs











Temp Pulse Resp BP Pulse Ox


 


 98.0 F   69   18   146/69   97 


 


 19 10:39  19 10:39  19 10:39  07/12/19 10:39  19 10:39














BMI 27.8





- General


well developed, well nourished, no distress





- Eyes


normal ocular movement





- ENT


no hearing loss, no congestion





- Neck


no masses, trachea midline





- Respiratory


normal respiratory effort, clear to auscultation





- Cardiovascular


Rhythm: regular


Heart Sounds: normal: S1, S2





- Abdomen


Abdomen: soft, non tender, no guarding, no rigid, no rebound





- Integumentary





normal turgor





- Neurologic


no disoriented, no combative





- Musculoskeletal


normal gait, normal posture





- Psychiatric


oriented to time, oriented to person, oriented to place, speech is normal, 

memory intact





breast exam:


Breasts: 


right breast: Multi-positional exam no dominant masses or nodules of concern, 

fibrocystic changes, examination reveals nipple discharge at the 12 o'clock 

position which was guiaced and this was positive for blood


Right axilla: No adenopathy of concern


Left breast: Multiple positional exam no dominant masses or nodules of concern, 

fibrocystic changes


Left axilla: No adenopathy of concern





Assessment and Plan


Assessment: 





Impression:


1.  Right breast bloody nipple discharge


2.  Fibrocystic breast changes


3.  Positive family history of cancer


4.arthritis


5. atrial fibrilation


6. uterine prolapse


7.  new onset vaginal bleeding





Plan:


1.  bilateral mammogram and follow-up.


2. follow up with gyn vaginal bleeding


3.  Medical management of medical conditions


CC Dr. Ontiveros

## 2019-07-15 ENCOUNTER — HOSPITAL ENCOUNTER (OUTPATIENT)
Dept: HOSPITAL 47 - RADMAMWWP | Age: 84
Discharge: HOME | End: 2019-07-15
Attending: SURGERY
Payer: MEDICARE

## 2019-07-15 DIAGNOSIS — R92.8: ICD-10-CM

## 2019-07-15 DIAGNOSIS — N64.52: Primary | ICD-10-CM

## 2019-07-15 PROCEDURE — 76642 ULTRASOUND BREAST LIMITED: CPT

## 2019-07-15 PROCEDURE — 77066 DX MAMMO INCL CAD BI: CPT

## 2019-07-15 PROCEDURE — 77062 BREAST TOMOSYNTHESIS BI: CPT

## 2019-07-16 NOTE — USB
Reason for exam: additional evaluation requested from abnormal screening.



History:

Patient is postmenopausal.

Family history of premenopausal breast cancer in aunt at age 30.



US Breast Limited BILAT

Right limited breast ultrasound including focal area of concern, retroareolar and 

axilla demonstrates a 0.2 x 0.3 x 0.1cm oval, hypoechoic lesion too small to 

characterize at 12 o'clock, a 0.5 x 0.2cm oval, hypoechoic lesion too small to 

characterize at 3 o'clock, duct ectasia at 8 o'clock, a 0.3 x 0.3 x 0.2cm oval, 

hypoechoic lesion too small to characterize at 9 o'clock, a calcification at 11 

o'clock, a 0.4 x 0.5 x 0.2cm oval, cystic lesion at the posterior nipple and a 0.3

x 0.3cm solid lesion in duct at 9 o'clock posterior nipple.



Left limited breast ultrasound including focal area of concern, retroareolar and 

axilla demonstrates ductal ectasia at the posterior nipple and a calcification at 

1 o'clock.



These results were verbally communicated with the patient and result sheet given 

to the patient on 7/15/19.





ASSESSMENT: Suspicious, BI-RAD 4



RECOMMENDATION:

Surgical consultation and ultrasound core biopsy of the right breast.



Called with mammographic findings and has scheduled an appointment for the patient

for 8/29/19 at 9:40 with Dr. Clemente.

Biopsy scheduled for 8/12/19.



PRELIMINARY REPORT CALLED AND FAXED TO DR. CLEMENTE ON 7/16/19.

## 2019-07-16 NOTE — MM
Reason for exam: clinical finding.

Last mammogram was performed 7 years and 8 months ago.



History:

Patient is postmenopausal.

Family history of premenopausal breast cancer in aunt at age 30.

Indicated problem(s): bloody discharge in the right breast.



Physical Findings:

Nurse Summary: 0.5 x 0.5cm nodule in the left breast at 12 o'clock (nurse ts).



MG 3D Diag Mammo W/Cad [facility]

Bilateral CC and MLO view(s) were taken.

Prior study comparison: November 10, 2011, CAD bilateral diagnostic mammogram.  

August 25, 2010, bilateral digital screening mammogram.

The breast tissue is heterogeneously dense. This may lower the sensitivity of 

mammography.  Benign appearing bilateral calcifications.



These results were verbally communicated with the patient and result sheet given 

to the patient on 7/15/19.





ASSESSMENT: Incomplete: need additional imaging evaluation, BI-RAD 0



RECOMMENDATION:

Ultrasound of both breasts.

## 2019-08-12 ENCOUNTER — HOSPITAL ENCOUNTER (OUTPATIENT)
Dept: HOSPITAL 47 - RADUSWWP | Age: 84
End: 2019-08-12
Attending: SURGERY
Payer: MEDICARE

## 2019-08-12 VITALS
DIASTOLIC BLOOD PRESSURE: 70 MMHG | HEART RATE: 70 BPM | TEMPERATURE: 98.2 F | RESPIRATION RATE: 16 BRPM | SYSTOLIC BLOOD PRESSURE: 118 MMHG

## 2019-08-12 VITALS — BODY MASS INDEX: 27.4 KG/M2

## 2019-08-12 DIAGNOSIS — Z88.1: ICD-10-CM

## 2019-08-12 DIAGNOSIS — Z88.5: ICD-10-CM

## 2019-08-12 DIAGNOSIS — R92.0: ICD-10-CM

## 2019-08-12 DIAGNOSIS — D24.1: Primary | ICD-10-CM

## 2019-08-12 DIAGNOSIS — N60.11: ICD-10-CM

## 2019-08-12 DIAGNOSIS — Z88.2: ICD-10-CM

## 2019-08-12 DIAGNOSIS — R92.8: ICD-10-CM

## 2019-08-12 DIAGNOSIS — Z88.8: ICD-10-CM

## 2019-08-12 PROCEDURE — 19083 BX BREAST 1ST LESION US IMAG: CPT

## 2019-08-12 PROCEDURE — 88305 TISSUE EXAM BY PATHOLOGIST: CPT

## 2019-08-12 NOTE — USB
EXAMINATION TYPE: US biopsy breast VAD RT

 

DATE OF EXAM: 8/12/2019

 

CLINICAL HISTORY: R92.8 ABN MAMMO.

 

TECHNIQUE: Ultrasound guided core biopsy of right 9:00 breast.  

 

COMPARISON: NONE

 

FINDINGS: The procedure of ultrasound guided core biopsy was explained to the 
patient.  Benefits, alternatives, and risks were discussed.  An informed consent
was then obtained.  

 

The patient was placed in supine positioning for  imaging and for the procedure.
The overlying skin was prepped and draped in usual sterile fashion.  Lidocaine 
buffered with bicarbonate was used as anesthetic into the skin and subcutaneous 
tissue up to area of concern in the right 9:00 breast.  A nick was made with 
surgical scalpel.  

 

Under ultrasound guidance, a 12-gauge vacuum assisted biopsy gun device was used
to obtain 2 core samples.  Following this, a biopsy clip was left in lesion.  

 

The patient tolerated the procedure well without any immediate complication.  
The patient was kept in the radiology department for short stay after the 
procedure and then discharged home in stable condition.  

 

 

 

IMPRESSION: Successful, uncomplicated ultrasound guided core biopsy of area of 
concern in the right 9:00 breast, full pathology results to follow. 

 

Pathology Results: High Risk



RIGHT BREAST, 9:00, ULTRASOUND GUIDED CORE BIOPSY: Intraductal papilloma and 
background fibrocystic changes including rare microcalcifications. 



Recommendation

Surgical consult of the right breast.

(intraductal papilloma)

MTDD

## 2019-08-19 ENCOUNTER — HOSPITAL ENCOUNTER (OUTPATIENT)
Dept: HOSPITAL 47 - WWCWWP | Age: 84
Discharge: HOME | End: 2019-08-19
Attending: SURGERY
Payer: MEDICARE

## 2019-08-19 VITALS
SYSTOLIC BLOOD PRESSURE: 185 MMHG | DIASTOLIC BLOOD PRESSURE: 82 MMHG | TEMPERATURE: 98.4 F | RESPIRATION RATE: 16 BRPM | HEART RATE: 75 BPM

## 2019-08-19 VITALS — BODY MASS INDEX: 27.1 KG/M2

## 2019-08-19 DIAGNOSIS — Z53.9: Primary | ICD-10-CM

## 2019-08-19 NOTE — P.PN
Subjective


Progress Note Date: 08/19/19


Principal diagnosis: 





Intraductal papilloma





The patient is an 89-year-old white female status post a [place] guided core 

biopsy of the right breast.  Pathology revealed an intraductal papilloma.  This 

was performed and 09900.  Postprocedure the patient developed marked ecchymosi

s of the breast with some hematoma formation.  She states that it has been 

uncomfortable for her because she is improving at this time.











Objective





- Vital Signs


Vital signs: 


                                   Vital Signs











Temp  98.4 F   08/19/19 13:46


 


Pulse  75   08/19/19 13:46


 


Resp  16   08/19/19 13:46


 


BP  185/82   08/19/19 13:46


 


Pulse Ox  99   08/19/19 13:46








                                 Intake & Output











 08/18/19 08/19/19 08/19/19





 18:59 06:59 18:59


 


Weight   71.668 kg














- Constitutional


General appearance: Present: average body habitus





- EENT


Eyes: Present: EOMI


ENT: Present: hearing grossly normal





- Neck


Neck: Present: normal ROM





- Respiratory


Respiratory: bilateral: CTA





- Cardiovascular


Heart sounds: normal: S1, S2





- Integumentary


Integumentary: Present: normal turgor





- Musculoskeletal


Musculoskeletal: Present: gait normal





- Psychiatric


Psychiatric: Present: A&O x's 3, appropriate affect, intact judgment & insight





- Additional findings


Additional findings: 





Right breast the entire breast appears to be ecchymotic


There is a hematoma approximately 2 cm in size at the 9:00 area of the breast





Assessment and Plan


Assessment: 





Impression:


1.  Atrial fibrillation


2.  Intraductal papilloma


3.  Post ultrasound hematoma/ecchymosis resolving





Plan:


1.  Medical management of medical conditions


2.  Consider needle local excisional biopsy for intraductal papilloma however at

89 years of age may wash this area conservatively


3.  Follow-up in 6 weeks to reassess resolving hematoma/ecchymosis


CC: DR. Ontiveros

## 2024-10-28 NOTE — P.PN
Subjective


Progress Note Date: 12/28/18


Principal diagnosis: 





pancytopenia





Platlet count redraw 42K today. 





Objective





- Vital Signs


Vital signs: 


 Vital Signs











Temp  98.3 F   12/28/18 06:30


 


Pulse  72   12/28/18 11:39


 


Resp  18   12/28/18 06:30


 


BP  178/73   12/28/18 06:30


 


Pulse Ox  95   12/28/18 06:30








 Intake & Output











 12/27/18 12/28/18 12/28/18





 18:59 06:59 18:59


 


Intake Total 554  


 


Balance 554  


 


Weight   73.9 kg


 


Intake:   


 


  Oral 554  


 


Other:   


 


  Voiding Method Toilet Toilet 


 


  # Voids 1 3 














- Exam








- Constitutional


General appearance: cooperative, no acute distress





- EENT


Eyes: EOMI, dentition normal


ENT: hard of hearing, NA/AT, normal oropharynx





- Neck





no lymphadenopathy


Neck: normal ROM





- Respiratory


Respiratory: bilateral: diminished (bases)





- Cardiovascular


Rhythm: irregularly irregular





- Gastrointestinal


General gastrointestinal: normal bowel sounds, soft





- Integumentary


Integumentary: pale





- Neurologic


Neurologic: CNII-XII intact





- Musculoskeletal


Musculoskeletal: generalized weakness, strength equal bilaterally





- Psychiatric


Psychiatric: appropriate affect








- Labs


CBC & Chem 7: 


 12/28/18 05:15





 12/28/18 05:15


Labs: 


 Abnormal Lab Results - Last 24 Hours (Table)











  12/27/18 12/27/18 12/27/18 Range/Units





  14:49 14:49 14:58 


 


WBC  2.3 L    (3.8-10.6)  k/uL


 


RBC  3.02 L    (3.80-5.40)  m/uL


 


Hgb  10.7 L    (11.4-16.0)  gm/dL


 


Hct  33.4 L    (34.0-46.0)  %


 


MCV  110.3 H    (80.0-100.0)  fL


 


MCH  35.5 H    (25.0-35.0)  pg


 


Plt Count  35 L    (150-450)  k/uL


 


Neutrophils # (Manual)  1.08 L    (1.3-7.7)  k/uL


 


Lymphocytes #     (1.0-4.8)  k/uL


 


Lymphocytes # (Manual)  0.64 L    (1.0-4.8)  k/uL


 


Retic Count  2.6 H    (0.5-2.0)  %


 


PT   21.6 H   (9.0-12.0)  sec


 


INR   2.2 H   (<1.2)  


 


APTT   39.3 H   (22.0-30.0)  sec


 


Chloride     ()  mmol/L


 


Glucose     (74-99)  mg/dL


 


POC Glucose (mg/dL)     (75-99)  mg/dL


 


Hemoglobin A1c     (4.0-6.0)  %


 


Calcium     (8.4-10.2)  mg/dL


 


Total Bilirubin     (0.2-1.3)  mg/dL


 


AST     (14-36)  U/L


 


Lactate Dehydrogenase    826 H  (313-618)  U/L


 


Total Protein (PEP)     (6.2-8.2)  g/dL


 


Albumin     (3.5-5.0)  g/dL














  12/27/18 12/27/18 12/27/18 Range/Units





  14:58 14:58 21:18 


 


WBC     (3.8-10.6)  k/uL


 


RBC     (3.80-5.40)  m/uL


 


Hgb     (11.4-16.0)  gm/dL


 


Hct     (34.0-46.0)  %


 


MCV     (80.0-100.0)  fL


 


MCH     (25.0-35.0)  pg


 


Plt Count     (150-450)  k/uL


 


Neutrophils # (Manual)     (1.3-7.7)  k/uL


 


Lymphocytes #     (1.0-4.8)  k/uL


 


Lymphocytes # (Manual)     (1.0-4.8)  k/uL


 


Retic Count     (0.5-2.0)  %


 


PT     (9.0-12.0)  sec


 


INR     (<1.2)  


 


APTT     (22.0-30.0)  sec


 


Chloride     ()  mmol/L


 


Glucose     (74-99)  mg/dL


 


POC Glucose (mg/dL)    139 H  (75-99)  mg/dL


 


Hemoglobin A1c  6.1 H    (4.0-6.0)  %


 


Calcium     (8.4-10.2)  mg/dL


 


Total Bilirubin     (0.2-1.3)  mg/dL


 


AST     (14-36)  U/L


 


Lactate Dehydrogenase     (313-618)  U/L


 


Total Protein (PEP)   5.9 L   (6.2-8.2)  g/dL


 


Albumin     (3.5-5.0)  g/dL














  12/28/18 12/28/18 12/28/18 Range/Units





  05:15 05:15 05:15 


 


WBC  3.0 L    (3.8-10.6)  k/uL


 


RBC  3.08 L    (3.80-5.40)  m/uL


 


Hgb  10.7 L    (11.4-16.0)  gm/dL


 


Hct  33.7 L    (34.0-46.0)  %


 


MCV  108.7 H    (80.0-100.0)  fL


 


MCH     (25.0-35.0)  pg


 


Plt Count  43 L    (150-450)  k/uL


 


Neutrophils # (Manual)     (1.3-7.7)  k/uL


 


Lymphocytes #  0.6 L    (1.0-4.8)  k/uL


 


Lymphocytes # (Manual)     (1.0-4.8)  k/uL


 


Retic Count     (0.5-2.0)  %


 


PT    16.9 H  (9.0-12.0)  sec


 


INR    1.7 H  (<1.2)  


 


APTT     (22.0-30.0)  sec


 


Chloride   109 H   ()  mmol/L


 


Glucose   140 H   (74-99)  mg/dL


 


POC Glucose (mg/dL)     (75-99)  mg/dL


 


Hemoglobin A1c     (4.0-6.0)  %


 


Calcium   7.3 L   (8.4-10.2)  mg/dL


 


Total Bilirubin   1.5 H   (0.2-1.3)  mg/dL


 


AST   79 H   (14-36)  U/L


 


Lactate Dehydrogenase     (313-618)  U/L


 


Total Protein (PEP)     (6.2-8.2)  g/dL


 


Albumin   2.5 L   (3.5-5.0)  g/dL














  12/28/18 12/28/18 Range/Units





  07:34 12:25 


 


WBC    (3.8-10.6)  k/uL


 


RBC    (3.80-5.40)  m/uL


 


Hgb    (11.4-16.0)  gm/dL


 


Hct    (34.0-46.0)  %


 


MCV    (80.0-100.0)  fL


 


MCH    (25.0-35.0)  pg


 


Plt Count    (150-450)  k/uL


 


Neutrophils # (Manual)    (1.3-7.7)  k/uL


 


Lymphocytes #    (1.0-4.8)  k/uL


 


Lymphocytes # (Manual)    (1.0-4.8)  k/uL


 


Retic Count    (0.5-2.0)  %


 


PT    (9.0-12.0)  sec


 


INR    (<1.2)  


 


APTT    (22.0-30.0)  sec


 


Chloride    ()  mmol/L


 


Glucose    (74-99)  mg/dL


 


POC Glucose (mg/dL)  140 H  130 H  (75-99)  mg/dL


 


Hemoglobin A1c    (4.0-6.0)  %


 


Calcium    (8.4-10.2)  mg/dL


 


Total Bilirubin    (0.2-1.3)  mg/dL


 


AST    (14-36)  U/L


 


Lactate Dehydrogenase    (313-618)  U/L


 


Total Protein (PEP)    (6.2-8.2)  g/dL


 


Albumin    (3.5-5.0)  g/dL








 Microbiology - Last 24 Hours (Table)











 12/26/18 00:33 Urine Culture - Final





 Urine,Clean Catch    Staphylococcus aureus














Assessment and Plan


Plan: 





Assessment and Recommendations:





1. Pancytopenia:


Leukopenia: with lymphocytopenia (intermittent since 2016)


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


 - Await full Work-up


Thrombocytopenia:


 - Known History of clumping platlets and underestimated platlet count when not 

drawn immediately in citrate tube and ran. Will attempt to re-drawn in am 12-28

, and run immediately. Although with concurrent acute infection cannot rule out 

accurate thrombocytopenia as her history has a mild thrombocytopenia at 

baseline and an infection may contribute to worsening of this. 


 - Re-work-up other causes (greater than 2 years, last 2016)


 - Patient is on Xarelto, would currently hold with platlet count less than 

50K. To ensure an accurate level for safety. 


 - Redraw in citrate tube and run immediately again on 12/29/18, may resume 

xarelto as long as closer to 50K in am. 





We will continue to follow. Resident